# Patient Record
Sex: FEMALE | Race: ASIAN | NOT HISPANIC OR LATINO
[De-identification: names, ages, dates, MRNs, and addresses within clinical notes are randomized per-mention and may not be internally consistent; named-entity substitution may affect disease eponyms.]

---

## 2017-01-11 ENCOUNTER — RECORDS - HEALTHEAST (OUTPATIENT)
Dept: ADMINISTRATIVE | Facility: OTHER | Age: 27
End: 2017-01-11

## 2017-03-21 ENCOUNTER — OFFICE VISIT - HEALTHEAST (OUTPATIENT)
Dept: FAMILY MEDICINE | Facility: CLINIC | Age: 27
End: 2017-03-21

## 2017-03-21 ENCOUNTER — COMMUNICATION - HEALTHEAST (OUTPATIENT)
Dept: TELEHEALTH | Facility: CLINIC | Age: 27
End: 2017-03-21

## 2017-03-21 ENCOUNTER — AMBULATORY - HEALTHEAST (OUTPATIENT)
Dept: LAB | Facility: CLINIC | Age: 27
End: 2017-03-21

## 2017-03-21 DIAGNOSIS — Z71.9 HEALTH EDUCATION: ICD-10-CM

## 2017-03-21 DIAGNOSIS — R73.03 PREDIABETES: ICD-10-CM

## 2017-03-21 DIAGNOSIS — E66.9 OBESITY (BMI 35.0-39.9 WITHOUT COMORBIDITY): ICD-10-CM

## 2017-03-21 DIAGNOSIS — E55.9 VITAMIN D DEFICIENCY: ICD-10-CM

## 2017-03-21 DIAGNOSIS — R63.5 WEIGHT GAIN: ICD-10-CM

## 2017-03-21 DIAGNOSIS — E88.810 DYSMETABOLIC SYNDROME: ICD-10-CM

## 2017-03-21 LAB
CHOLEST SERPL-MCNC: 170 MG/DL
FASTING STATUS PATIENT QL REPORTED: YES
HBA1C MFR BLD: 5.5 % (ref 3.5–6)
HDLC SERPL-MCNC: 47 MG/DL
LDLC SERPL CALC-MCNC: 77 MG/DL
TRIGL SERPL-MCNC: 229 MG/DL

## 2017-03-21 ASSESSMENT — MIFFLIN-ST. JEOR: SCORE: 1707.89

## 2017-03-23 ENCOUNTER — COMMUNICATION - HEALTHEAST (OUTPATIENT)
Dept: FAMILY MEDICINE | Facility: CLINIC | Age: 27
End: 2017-03-23

## 2017-03-25 ENCOUNTER — COMMUNICATION - HEALTHEAST (OUTPATIENT)
Dept: FAMILY MEDICINE | Facility: CLINIC | Age: 27
End: 2017-03-25

## 2017-04-17 ENCOUNTER — OFFICE VISIT - HEALTHEAST (OUTPATIENT)
Dept: FAMILY MEDICINE | Facility: CLINIC | Age: 27
End: 2017-04-17

## 2017-04-17 DIAGNOSIS — E88.810 DYSMETABOLIC SYNDROME: ICD-10-CM

## 2017-04-17 DIAGNOSIS — R73.03 PREDIABETES: ICD-10-CM

## 2017-04-17 ASSESSMENT — MIFFLIN-ST. JEOR: SCORE: 1710.61

## 2017-05-16 ENCOUNTER — OFFICE VISIT - HEALTHEAST (OUTPATIENT)
Dept: FAMILY MEDICINE | Facility: CLINIC | Age: 27
End: 2017-05-16

## 2017-05-16 DIAGNOSIS — E88.810 DYSMETABOLIC SYNDROME: ICD-10-CM

## 2017-05-16 DIAGNOSIS — R73.03 PREDIABETES: ICD-10-CM

## 2017-05-16 ASSESSMENT — MIFFLIN-ST. JEOR: SCORE: 1646.66

## 2017-06-12 ENCOUNTER — OFFICE VISIT - HEALTHEAST (OUTPATIENT)
Dept: FAMILY MEDICINE | Facility: CLINIC | Age: 27
End: 2017-06-12

## 2017-06-12 DIAGNOSIS — Z30.9 CONTRACEPTION MANAGEMENT: ICD-10-CM

## 2017-06-12 DIAGNOSIS — R73.03 PREDIABETES: ICD-10-CM

## 2017-06-12 DIAGNOSIS — E55.9 VITAMIN D DEFICIENCY: ICD-10-CM

## 2017-06-12 DIAGNOSIS — E88.810 DYSMETABOLIC SYNDROME: ICD-10-CM

## 2017-06-12 ASSESSMENT — MIFFLIN-ST. JEOR: SCORE: 1613.54

## 2017-07-10 ENCOUNTER — COMMUNICATION - HEALTHEAST (OUTPATIENT)
Dept: FAMILY MEDICINE | Facility: CLINIC | Age: 27
End: 2017-07-10

## 2017-07-13 ENCOUNTER — AMBULATORY - HEALTHEAST (OUTPATIENT)
Dept: LAB | Facility: CLINIC | Age: 27
End: 2017-07-13

## 2017-07-13 DIAGNOSIS — E55.9 VITAMIN D DEFICIENCY: ICD-10-CM

## 2017-07-13 DIAGNOSIS — E88.810 DYSMETABOLIC SYNDROME: ICD-10-CM

## 2017-07-13 LAB
CHOLEST SERPL-MCNC: 203 MG/DL
FASTING STATUS PATIENT QL REPORTED: YES
HDLC SERPL-MCNC: 48 MG/DL
LDLC SERPL CALC-MCNC: 116 MG/DL
TRIGL SERPL-MCNC: 193 MG/DL

## 2017-07-17 ENCOUNTER — OFFICE VISIT - HEALTHEAST (OUTPATIENT)
Dept: FAMILY MEDICINE | Facility: CLINIC | Age: 27
End: 2017-07-17

## 2017-07-17 DIAGNOSIS — R73.03 PREDIABETES: ICD-10-CM

## 2017-07-17 DIAGNOSIS — E88.810 DYSMETABOLIC SYNDROME: ICD-10-CM

## 2017-07-17 DIAGNOSIS — N91.2 AMENORRHEA: ICD-10-CM

## 2017-07-17 ASSESSMENT — MIFFLIN-ST. JEOR: SCORE: 1593.59

## 2017-08-15 ENCOUNTER — OFFICE VISIT - HEALTHEAST (OUTPATIENT)
Dept: FAMILY MEDICINE | Facility: CLINIC | Age: 27
End: 2017-08-15

## 2017-08-15 DIAGNOSIS — E88.810 DYSMETABOLIC SYNDROME: ICD-10-CM

## 2017-08-15 DIAGNOSIS — R73.03 PREDIABETES: ICD-10-CM

## 2017-08-15 ASSESSMENT — MIFFLIN-ST. JEOR: SCORE: 1606.74

## 2017-09-13 ENCOUNTER — OFFICE VISIT - HEALTHEAST (OUTPATIENT)
Dept: FAMILY MEDICINE | Facility: CLINIC | Age: 27
End: 2017-09-13

## 2017-09-13 DIAGNOSIS — E88.810 DYSMETABOLIC SYNDROME: ICD-10-CM

## 2017-09-13 DIAGNOSIS — E55.9 VITAMIN D DEFICIENCY: ICD-10-CM

## 2017-09-13 DIAGNOSIS — R73.03 PREDIABETES: ICD-10-CM

## 2017-09-13 NOTE — ASSESSMENT & PLAN NOTE
27 y.o. year old female in clinic today to discuss treatment of the following conditions through diet and lifestyle modification and weight loss:  1. BMI 38.0-38.9,adult    2. Prediabetes    3. Dysmetabolic syndrome    4. Vitamin D deficiency      The patient's efforts this past month were successful as evidenced by weight loss.  The patient was unable to establish an exercise regimen that worked with her knee injury.  Her surgeries next week.  We discussed adherence to physical therapy recommendations as well as resistance training and core strengthening.  The patient will specifically work on resuming meal prepping/planning she found this particularly helpful early in the program.  She is not interested in tracking and using a quantitative approach.  She will abstain from phentermine until after surgery.  Return to clinic in 1 month.

## 2017-10-16 ENCOUNTER — OFFICE VISIT - HEALTHEAST (OUTPATIENT)
Dept: FAMILY MEDICINE | Facility: CLINIC | Age: 27
End: 2017-10-16

## 2017-10-16 DIAGNOSIS — E55.9 VITAMIN D DEFICIENCY: ICD-10-CM

## 2017-10-16 DIAGNOSIS — E88.810 DYSMETABOLIC SYNDROME: ICD-10-CM

## 2017-10-16 DIAGNOSIS — R73.03 PREDIABETES: ICD-10-CM

## 2017-10-16 ASSESSMENT — MIFFLIN-ST. JEOR: SCORE: 1593.59

## 2017-12-13 ENCOUNTER — OFFICE VISIT - HEALTHEAST (OUTPATIENT)
Dept: FAMILY MEDICINE | Facility: CLINIC | Age: 27
End: 2017-12-13

## 2017-12-13 DIAGNOSIS — E55.9 VITAMIN D DEFICIENCY: ICD-10-CM

## 2017-12-13 DIAGNOSIS — R73.03 PREDIABETES: ICD-10-CM

## 2017-12-13 DIAGNOSIS — E88.810 DYSMETABOLIC SYNDROME: ICD-10-CM

## 2018-01-25 ENCOUNTER — COMMUNICATION - HEALTHEAST (OUTPATIENT)
Dept: FAMILY MEDICINE | Facility: CLINIC | Age: 28
End: 2018-01-25

## 2018-01-25 DIAGNOSIS — E88.810 DYSMETABOLIC SYNDROME: ICD-10-CM

## 2018-01-25 DIAGNOSIS — R73.03 PREDIABETES: ICD-10-CM

## 2018-01-25 DIAGNOSIS — E55.9 VITAMIN D DEFICIENCY: ICD-10-CM

## 2018-02-08 ENCOUNTER — OFFICE VISIT - HEALTHEAST (OUTPATIENT)
Dept: FAMILY MEDICINE | Facility: CLINIC | Age: 28
End: 2018-02-08

## 2018-02-08 DIAGNOSIS — E55.9 VITAMIN D DEFICIENCY: ICD-10-CM

## 2018-02-08 DIAGNOSIS — E88.810 DYSMETABOLIC SYNDROME: ICD-10-CM

## 2018-02-08 DIAGNOSIS — R73.03 PREDIABETES: ICD-10-CM

## 2018-02-08 ASSESSMENT — MIFFLIN-ST. JEOR: SCORE: 1601.75

## 2018-08-17 ENCOUNTER — COMMUNICATION - HEALTHEAST (OUTPATIENT)
Dept: FAMILY MEDICINE | Facility: CLINIC | Age: 28
End: 2018-08-17

## 2020-06-01 ENCOUNTER — COMMUNICATION - HEALTHEAST (OUTPATIENT)
Dept: FAMILY MEDICINE | Facility: CLINIC | Age: 30
End: 2020-06-01

## 2020-06-02 ENCOUNTER — COMMUNICATION - HEALTHEAST (OUTPATIENT)
Dept: FAMILY MEDICINE | Facility: CLINIC | Age: 30
End: 2020-06-02

## 2020-06-04 ENCOUNTER — OFFICE VISIT - HEALTHEAST (OUTPATIENT)
Dept: FAMILY MEDICINE | Facility: CLINIC | Age: 30
End: 2020-06-04

## 2020-06-04 DIAGNOSIS — E88.810 DYSMETABOLIC SYNDROME: ICD-10-CM

## 2020-06-04 DIAGNOSIS — E55.9 VITAMIN D DEFICIENCY: ICD-10-CM

## 2020-06-04 DIAGNOSIS — E66.01 CLASS 3 SEVERE OBESITY DUE TO EXCESS CALORIES WITH SERIOUS COMORBIDITY AND BODY MASS INDEX (BMI) OF 40.0 TO 44.9 IN ADULT (H): ICD-10-CM

## 2020-06-04 DIAGNOSIS — R06.83 SNORES: ICD-10-CM

## 2020-06-04 DIAGNOSIS — R73.03 PREDIABETES: ICD-10-CM

## 2020-06-04 DIAGNOSIS — Z13.29 SCREENING FOR THYROID DISORDER: ICD-10-CM

## 2020-06-04 DIAGNOSIS — E66.813 CLASS 3 SEVERE OBESITY DUE TO EXCESS CALORIES WITH SERIOUS COMORBIDITY AND BODY MASS INDEX (BMI) OF 40.0 TO 44.9 IN ADULT (H): ICD-10-CM

## 2020-06-04 DIAGNOSIS — Z13.0 SCREENING FOR DEFICIENCY ANEMIA: ICD-10-CM

## 2020-06-04 NOTE — ASSESSMENT & PLAN NOTE
30 y.o. female in clinic today to discuss treatment of the following conditions through radical diet change, lifestyle modification and weight loss:  1. Class 3 severe obesity due to excess calories with serious comorbidity and body mass index (BMI) of 40.0 to 44.9 in adult (H)    2. Dysmetabolic syndrome    3. Prediabetes    4. Vitamin D deficiency    5. Snores    6. Screening for thyroid disorder    7. Screening for deficiency anemia      This patient was seen and evaluated after lengthy absence from our medical weight loss program.  In the past, she lost approximately 15% of her total body weight while on a high dose of phentermine.  She stopped taking the medicine and resumed eating her previous diet when she ruptured her ACL and underwent a surgery.  Her weight today is as high as is ever been.  Laboratory testing from 2017 fits with metabolic syndrome.  She is a strong family history of diabetes in her mother who has recently diagnosed with diabetes and is now on insulin.  Based on her description of her diet, I estimate that her current menu is at least 75% carbohydrate-based.  We do lengthy conversation regarding the nature of hyperinsulinemia, metabolic syndrome and prediabetes.  We will repeat her fasting lab work within the next 1-2 weeks to confirm that her history is still accurate.    I recommended a low carbohydrate/high protein/high fat diet.  She mentioned that she has a friend who is been very successful with a ketogenic style of eating.  There is no contraindication to her pursuing this strategy.  We discussed that culturally it may be difficult given her heritage in the foods that she is traditionally been exposed to.    To help facilitate her dietary changes, I have recommended medications that are listed below.  We discussed the side effects of these medications including the phentermine and topiramate a pregnancy category X.  The patient reports that she is sexually active and she diligently  uses a condom with her male partner.    Other aspects of her history that were not fully explored include snoring which is worsened as she gained weight, lack of hours in bed (she estimates that she only sleeps 6 hours per night).  She also has not been exercising on a regular basis.    I have recommended the following interventions:    Medications Ordered   Medications     metFORMIN (GLUCOPHAGE XR) 500 MG 24 hr tablet     Sig: Take 1 tablet (500 mg total) by mouth daily with breakfast.     Dispense:  90 tablet     Refill:  1     phentermine 15 MG capsule     Sig:Take 1 capsule (15 mg total) by mouth every morning.     Dispense:  30 capsule     Refill:  0     topiramate (TOPAMAX) 25 MG tablet     Sig: Take 1 tablet (25 mg total) by mouth 2 (two) times a day. (1 tablet/dinner for first 7 days.  Add AM dose if no side effects)     Dispense:  60 tablet     Refill:  1     This patient is a candidate for bariatric surgery (based on BMI or BMI + comorbidities).  This option was not discussed.    Barriers to weight loss that are not modifiable at this time include: none.     [place] for the follow-up appointment will include goals of therapy, definition of success and focus on physical activity/exercise plan.      Return to clinic in 4 weeks.

## 2020-06-11 ENCOUNTER — AMBULATORY - HEALTHEAST (OUTPATIENT)
Dept: LAB | Facility: CLINIC | Age: 30
End: 2020-06-11

## 2020-06-11 DIAGNOSIS — Z13.0 SCREENING FOR DEFICIENCY ANEMIA: ICD-10-CM

## 2020-06-11 DIAGNOSIS — E88.810 DYSMETABOLIC SYNDROME: ICD-10-CM

## 2020-06-11 DIAGNOSIS — R73.03 PREDIABETES: ICD-10-CM

## 2020-06-11 DIAGNOSIS — E66.01 CLASS 3 SEVERE OBESITY DUE TO EXCESS CALORIES WITH SERIOUS COMORBIDITY AND BODY MASS INDEX (BMI) OF 40.0 TO 44.9 IN ADULT (H): ICD-10-CM

## 2020-06-11 DIAGNOSIS — Z13.29 SCREENING FOR THYROID DISORDER: ICD-10-CM

## 2020-06-11 DIAGNOSIS — E55.9 VITAMIN D DEFICIENCY: ICD-10-CM

## 2020-06-11 DIAGNOSIS — E66.813 CLASS 3 SEVERE OBESITY DUE TO EXCESS CALORIES WITH SERIOUS COMORBIDITY AND BODY MASS INDEX (BMI) OF 40.0 TO 44.9 IN ADULT (H): ICD-10-CM

## 2020-06-11 LAB
ANION GAP SERPL CALCULATED.3IONS-SCNC: 12 MMOL/L (ref 5–18)
BUN SERPL-MCNC: 12 MG/DL (ref 8–22)
CALCIUM SERPL-MCNC: 9.6 MG/DL (ref 8.5–10.5)
CHLORIDE BLD-SCNC: 105 MMOL/L (ref 98–107)
CHOLEST SERPL-MCNC: 204 MG/DL
CO2 SERPL-SCNC: 23 MMOL/L (ref 22–31)
CREAT SERPL-MCNC: 0.81 MG/DL (ref 0.6–1.1)
ERYTHROCYTE [DISTWIDTH] IN BLOOD BY AUTOMATED COUNT: 12 % (ref 11–14.5)
FASTING STATUS PATIENT QL REPORTED: YES
GFR SERPL CREATININE-BSD FRML MDRD: >60 ML/MIN/1.73M2
GLUCOSE BLD-MCNC: 101 MG/DL (ref 70–125)
HBA1C MFR BLD: 5.2 % (ref 3.5–6)
HCT VFR BLD AUTO: 44.1 % (ref 35–47)
HDLC SERPL-MCNC: 48 MG/DL
HGB BLD-MCNC: 15 G/DL (ref 12–16)
LDLC SERPL CALC-MCNC: 121 MG/DL
MCH RBC QN AUTO: 29.4 PG (ref 27–34)
MCHC RBC AUTO-ENTMCNC: 34 G/DL (ref 32–36)
MCV RBC AUTO: 87 FL (ref 80–100)
PLATELET # BLD AUTO: 311 THOU/UL (ref 140–440)
PMV BLD AUTO: 7.6 FL (ref 7–10)
POTASSIUM BLD-SCNC: 4.5 MMOL/L (ref 3.5–5)
RBC # BLD AUTO: 5.1 MILL/UL (ref 3.8–5.4)
SODIUM SERPL-SCNC: 140 MMOL/L (ref 136–145)
TRIGL SERPL-MCNC: 173 MG/DL
TSH SERPL DL<=0.005 MIU/L-ACNC: 2.3 UIU/ML (ref 0.3–5)
WBC: 4.6 THOU/UL (ref 4–11)

## 2020-06-12 LAB
25(OH)D3 SERPL-MCNC: 16 NG/ML (ref 30–80)
25(OH)D3 SERPL-MCNC: 16 NG/ML (ref 30–80)

## 2020-07-03 ENCOUNTER — OFFICE VISIT - HEALTHEAST (OUTPATIENT)
Dept: FAMILY MEDICINE | Facility: CLINIC | Age: 30
End: 2020-07-03

## 2020-07-03 DIAGNOSIS — E88.810 DYSMETABOLIC SYNDROME: ICD-10-CM

## 2020-07-03 DIAGNOSIS — E66.01 CLASS 3 SEVERE OBESITY DUE TO EXCESS CALORIES WITH SERIOUS COMORBIDITY AND BODY MASS INDEX (BMI) OF 40.0 TO 44.9 IN ADULT (H): ICD-10-CM

## 2020-07-03 DIAGNOSIS — E66.813 CLASS 3 SEVERE OBESITY DUE TO EXCESS CALORIES WITH SERIOUS COMORBIDITY AND BODY MASS INDEX (BMI) OF 40.0 TO 44.9 IN ADULT (H): ICD-10-CM

## 2020-07-03 DIAGNOSIS — R73.03 PREDIABETES: ICD-10-CM

## 2020-07-03 NOTE — ASSESSMENT & PLAN NOTE
30 y.o. year old female in clinic today to discuss treatment of the following conditions through diet and lifestyle modification and weight loss:  1. Class 3 severe obesity due to excess calories with serious comorbidity and body mass index (BMI) of 40.0 to 44.9 in adult (H)    2. Dysmetabolic syndrome    3. Prediabetes      The patient's weight loss result sincethe last visit was successful based on improvement in nutrition.  Hunger is improved.  She is more aware of her nutrition.  She does feel restricted with her meal plan and she has not had much physical activity.    - continue phentermine.  Increase topiramate   - continue metformin   - check in 4 weeks or so   - add variety to meal plans.

## 2020-07-06 ENCOUNTER — COMMUNICATION - HEALTHEAST (OUTPATIENT)
Dept: FAMILY MEDICINE | Facility: CLINIC | Age: 30
End: 2020-07-06

## 2020-07-09 ENCOUNTER — COMMUNICATION - HEALTHEAST (OUTPATIENT)
Dept: FAMILY MEDICINE | Facility: CLINIC | Age: 30
End: 2020-07-09

## 2020-08-04 ENCOUNTER — OFFICE VISIT - HEALTHEAST (OUTPATIENT)
Dept: FAMILY MEDICINE | Facility: CLINIC | Age: 30
End: 2020-08-04

## 2020-08-04 DIAGNOSIS — E55.9 VITAMIN D DEFICIENCY: ICD-10-CM

## 2020-08-04 DIAGNOSIS — R06.83 SNORES: ICD-10-CM

## 2020-08-04 DIAGNOSIS — E88.810 DYSMETABOLIC SYNDROME: ICD-10-CM

## 2020-08-04 DIAGNOSIS — E66.813 CLASS 3 SEVERE OBESITY DUE TO EXCESS CALORIES WITH SERIOUS COMORBIDITY AND BODY MASS INDEX (BMI) OF 40.0 TO 44.9 IN ADULT (H): ICD-10-CM

## 2020-08-04 DIAGNOSIS — E66.01 CLASS 3 SEVERE OBESITY DUE TO EXCESS CALORIES WITH SERIOUS COMORBIDITY AND BODY MASS INDEX (BMI) OF 40.0 TO 44.9 IN ADULT (H): ICD-10-CM

## 2020-08-04 DIAGNOSIS — R73.03 PREDIABETES: ICD-10-CM

## 2020-08-04 NOTE — ASSESSMENT & PLAN NOTE
"30 y.o. year old female in clinic today to discuss treatment of the following conditions through diet and lifestyle modification and weight loss:  1. Class 3 severe obesity due to excess calories with serious comorbidity and body mass index (BMI) of 40.0 to 44.9 in adult (H)    2. Dysmetabolic syndrome    3. Vitamin D deficiency    4. Prediabetes    5. Snores      The patient's weight loss result since the last visit was mixed based on weight stability.  The patient has been off plan for the past month.  She has a goal to meal plan and shop for healthy foods.  I discouraged her from her goal of \"keto\" eating.     - continue phentermine/topiramate.  No side effects with dose adjustment.     - continue walking/swimming and exercising.    - make a meal plan.    Follow-up: 1 months    "

## 2020-08-31 ENCOUNTER — OFFICE VISIT - HEALTHEAST (OUTPATIENT)
Dept: FAMILY MEDICINE | Facility: CLINIC | Age: 30
End: 2020-08-31

## 2020-08-31 DIAGNOSIS — E88.810 DYSMETABOLIC SYNDROME: ICD-10-CM

## 2020-08-31 DIAGNOSIS — R73.03 PREDIABETES: ICD-10-CM

## 2020-08-31 DIAGNOSIS — E66.813 CLASS 3 SEVERE OBESITY DUE TO EXCESS CALORIES WITH SERIOUS COMORBIDITY AND BODY MASS INDEX (BMI) OF 40.0 TO 44.9 IN ADULT (H): ICD-10-CM

## 2020-08-31 DIAGNOSIS — E66.01 CLASS 3 SEVERE OBESITY DUE TO EXCESS CALORIES WITH SERIOUS COMORBIDITY AND BODY MASS INDEX (BMI) OF 40.0 TO 44.9 IN ADULT (H): ICD-10-CM

## 2020-08-31 RX ORDER — METFORMIN HCL 500 MG
500 TABLET, EXTENDED RELEASE 24 HR ORAL
Qty: 90 TABLET | Refills: 1 | Status: SHIPPED | OUTPATIENT
Start: 2020-08-31 | End: 2022-09-19

## 2020-08-31 NOTE — ASSESSMENT & PLAN NOTE
30 y.o. year old female in clinic today to discuss treatment of the following conditions through diet and lifestyle modification and weight loss:  1. Class 3 severe obesity due to excess calories with serious comorbidity and body mass index (BMI) of 40.0 to 44.9 in adult (H)    2. Prediabetes    3. Dysmetabolic syndrome      The patient's weight loss result sincethe last visit was successful based on weight loss.  Weekend diet is different than her week day nutrition.   She is sleep deprived.  She is adding more nutrition.    - exercise has been scarce.     - recommended more sleep per night   - continue phentermine/topiramate.  Continue metformin.   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   - 6 week follow-up

## 2020-10-16 ENCOUNTER — OFFICE VISIT - HEALTHEAST (OUTPATIENT)
Dept: FAMILY MEDICINE | Facility: CLINIC | Age: 30
End: 2020-10-16

## 2020-10-16 DIAGNOSIS — R73.03 PREDIABETES: ICD-10-CM

## 2020-10-16 DIAGNOSIS — R06.83 SNORES: ICD-10-CM

## 2020-10-16 DIAGNOSIS — E66.813 CLASS 3 SEVERE OBESITY DUE TO EXCESS CALORIES WITH SERIOUS COMORBIDITY AND BODY MASS INDEX (BMI) OF 40.0 TO 44.9 IN ADULT (H): ICD-10-CM

## 2020-10-16 DIAGNOSIS — E66.01 CLASS 3 SEVERE OBESITY DUE TO EXCESS CALORIES WITH SERIOUS COMORBIDITY AND BODY MASS INDEX (BMI) OF 40.0 TO 44.9 IN ADULT (H): ICD-10-CM

## 2020-10-16 DIAGNOSIS — E55.9 VITAMIN D DEFICIENCY: ICD-10-CM

## 2020-10-16 DIAGNOSIS — E88.810 DYSMETABOLIC SYNDROME: ICD-10-CM

## 2020-10-16 RX ORDER — TOPIRAMATE 25 MG/1
TABLET, FILM COATED ORAL
Qty: 90 TABLET | Status: SHIPPED | OUTPATIENT
Start: 2020-10-16 | End: 2022-09-19

## 2020-10-16 RX ORDER — TOPIRAMATE 25 MG/1
50 TABLET, FILM COATED ORAL
Refills: 2 | Status: SHIPPED | OUTPATIENT
Start: 2020-10-16 | End: 2022-09-19

## 2020-10-16 RX ORDER — TOPIRAMATE 25 MG/1
25 TABLET, FILM COATED ORAL DAILY
Refills: 2 | Status: SHIPPED | OUTPATIENT
Start: 2020-10-16 | End: 2022-09-19

## 2020-10-16 NOTE — ASSESSMENT & PLAN NOTE
"30 y.o. year old female in clinic today to discuss treatment of the following conditions through diet and lifestyle modification and weight loss:  1. Class 3 severe obesity due to excess calories with serious comorbidity and body mass index (BMI) of 40.0 to 44.9 in adult (H)    2. Dysmetabolic syndrome    3. Prediabetes    4. Vitamin D deficiency    5. Snores      The patient's weight loss result since the last visit was successful based on increased exercise.  She anticipates struggles with ad graeme carb eating during the holiday season.  She is planning\"discipline\" during the holiday seasons.  \"Weight is a little slow.\"   - continue phentermine/topiramate.  We will increase dose of phentermine for late November through early January and then revert to previous dose of 15 mg.  Patient understands that this is to solidify the lifestyle gains she has earned throughout the summer.   - I encouraged her to think about her exercise plan for the winter months   - 2 month follow-up recommended based on perceived holiday season challenges.   "

## 2021-05-30 VITALS — BODY MASS INDEX: 37.94 KG/M2 | HEIGHT: 64 IN | WEIGHT: 222.2 LBS

## 2021-05-30 VITALS — BODY MASS INDEX: 38.04 KG/M2 | HEIGHT: 64 IN | WEIGHT: 222.8 LBS

## 2021-05-31 VITALS — BODY MASS INDEX: 33.63 KG/M2 | HEIGHT: 64 IN | WEIGHT: 197 LBS

## 2021-05-31 VITALS — HEIGHT: 64 IN | WEIGHT: 197 LBS | BODY MASS INDEX: 33.63 KG/M2

## 2021-05-31 VITALS — HEIGHT: 64 IN | BODY MASS INDEX: 34.38 KG/M2 | WEIGHT: 201.4 LBS

## 2021-05-31 VITALS — WEIGHT: 200.9 LBS | BODY MASS INDEX: 34.48 KG/M2

## 2021-05-31 VITALS — BODY MASS INDEX: 34.13 KG/M2 | WEIGHT: 199.9 LBS | HEIGHT: 64 IN

## 2021-05-31 VITALS — BODY MASS INDEX: 33.64 KG/M2 | WEIGHT: 196 LBS

## 2021-05-31 VITALS — BODY MASS INDEX: 35.63 KG/M2 | WEIGHT: 208.7 LBS | HEIGHT: 64 IN

## 2021-06-01 VITALS — BODY MASS INDEX: 33.94 KG/M2 | HEIGHT: 64 IN | WEIGHT: 198.8 LBS

## 2021-06-04 VITALS — BODY MASS INDEX: 42.74 KG/M2 | WEIGHT: 249 LBS

## 2021-06-04 VITALS — BODY MASS INDEX: 41.02 KG/M2 | WEIGHT: 239 LBS

## 2021-06-05 VITALS — WEIGHT: 224 LBS | BODY MASS INDEX: 38.45 KG/M2

## 2021-06-08 NOTE — PATIENT INSTRUCTIONS - HE
Keys to Success    Minimum 3 meals per day, control daily calories   - 1200 female   - 1600 male  Minimum of 4 palm servings of protein daily.  Begin everyday with protein    - ~80 - 90 gm for female   - 120 gm for male  Be mindful of net carbs  50-75 gm/day   - (Total carbs - fiber)   - Less than 5 gm of carbs with breakfast    Supplementation   - 1 multivitamin    - clear and copious urination (adequate water consumption)   - 2000 mg fish oil capsules    - 2000 iu Vitamin D (I will be checking your level today and may send a prescription to your pharmacy if necessary)  ______________________________    Dr. Lane Hernandez MD   - The Obesity Code   - YouTube    Dr. Julio Felipe MD   - Always Hungry    Dr. Yenni uRano, DO.   Search for her name in Youtube with added criteria: Elisa Schmidt
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
## 2021-06-09 NOTE — PROGRESS NOTES
PATIENT INTAKE      ASSESSMENT:    1. Dysmetabolic syndrome     2. Obesity (BMI 35.0-39.9 without comorbidity)  Comprehensive Metabolic Panel    Glycosylated Hemoglobin A1c    HM2(CBC w/o Differential)    Lipid Cascade    Thyroid Cascade    Vitamin D, Total (25-Hydroxy)   3. Prediabetes     4. Vitamin D deficiency           PLAN    The patient attended group visit to learn about obesity as a disease, an approach to treatment and metabolic factors.  Nutrition counseling reviewed.    Labs ordered and will review and discuss with the patient.    Body composition analyzer done and results reviewed with the patient.  Please see scanned results in chart.    Discussed with the patient that phentermine is a pregnancy category X medication and that is is essential that a reliable form of birth control be used while taking this medication if the patient will be sexually active.  She expresses understanding.    Rx for phentermine given and discussed risks, benefits, potential side effects, and when to take the medication.  All questions were answered.    Metformin prescribed due to findings consistent with prediabetes and dysmetabolic syndrome.    Recommend journaling and tracking food intake using either an online program such as myfitnesspal.com or loseit.com or tracking using a paper and pencil.  Advised paying particular attention to total carbohydrates, fiber, protein, calories, and fats, and added sugars.     Recommend increasing movement throughout the day--sitting less, moving more.  Will increase activity over time to reach a goal of at least 150 minutes of moderate exercise each week.    Behavior modification:  Cognitive restructuring exercises, journaling stressors, triggers for food cravings.    Dietary Intervention:  Reduced calorie, reduced carbohydrates, whole food diet.    Greater than 50% of this 30 minute visit was spent in counseling regarding obesity is a disease as well as the nutritional and exercise  recommendations of our program as it pertains to the patient's own individual healthcare needs.    Patient instructed to follow up in 1 month.      This note has been dictated using voice recognition software. Any grammatical or context distortions are unintentional and inherent to the software      SUBJECTIVE:    Patient presents for treatment of chronic, comorbid conditions using intensive therapeutic lifestyle interventions including nutrition, physical activity, and behavior management.  The patient is motivated to lose weight in order to feel better.  She specifically mentions that she feels achy and has foot pain and attributes that to her excessive weight.  The patient successfully lost weight at the age of 18 through vigorous exercise as well as not eating very much.  She has steadily gained weight since that time.  The patient is single and is not currently in a sexual relationship.  She works full-time as a  and often works clear into the evening hours.  She consistently skips breakfast at least 5 times per week.  She will then eat around 1 or 9 PM her to other meals a day.  She often struggles with an excessive appetite and finds when she overeats.  She also reports that she frequently sits in front of the TV or computer when eating.  The patient notes that she did tear her ACL about a month ago and plans to have that surgically repaired closer to the end of the year.  She enjoys being physically active and enjoys sports.    What would you like to weigh (goal weight):  150  At what age were you last at that weight:  18  Any previous prescription weight loss medication:  No  Menses regular:  yes  Number of children:  0  Are you pregnant: no  Are you currently using contraception: no  Do you exercise regularly:  No  Any problems with exercise:  Yes; recently torn ACL  Do you eat nutritiously?  no  Do you eat excessively?  yes  Do you count calories?  no  Do you snore at night or ever stop  breathing? yes  Have you been overweight all your life?  yes  If not, how long?  n/a  Do you have a history of eating disorder?  No  Have you ever had or been treated for alcohol or other substance abuse/dependence:   No    Patient Active Problem List   Diagnosis     Dysmetabolic syndrome     Prediabetes     Vitamin D deficiency       No past medical history on file.    No past surgical history on file.    No current outpatient prescriptions on file prior to visit.     No current facility-administered medications on file prior to visit.        No Known Allergies      No family history on file.  Family History also positive for  hypertension in her father.    Social History     Social History     Marital status: Single     Spouse name: N/A     Number of children: N/A     Years of education: N/A     Social History Main Topics     Smoking status: Never Smoker     Smokeless tobacco: Never Used     Alcohol use Not on file     Drug use: Not on file     Sexual activity: Not on file     Other Topics Concern     Not on file     Social History Narrative     No narrative on file         ROS  A comprehensive review of systems was negative.  Pertinent items are noted in HPI.  Patient denies chest pain or pressure, shortness of breath, exertional intolerance, palpitations, or lightheadedness.      Vitals:    03/21/17 0815   BP: 138/82   Pulse: 72     Initial Weight: 222.2 lbs  Weight: 222 lb 3.2 oz (100.8 kg)  Weight loss from initial: 0  % Weight loss: 0 %  Body Fat %: 47.8  Waist Circumference: 47.5 inches  Neck Circumference: 15 inches  Body mass index is 38.14 kg/(m^2).    EXAM                    General   Appearance:  Alert, pleasant, cooperative, no distress, appears stated age, patient is obese   Neck:   Supple, symmetrical, trachea midline, no adenopathy;     thyroid:  no enlargement/tenderness/nodules   Lungs:     Clear to auscultation bilaterally without wheezes, rales, or rhonchi, respirations unlabored    Heart:     Regular rate and rhythm, S1 and S2 normal, no murmur, rub   or gallop                                 Abdomen:  Soft, nontender, nondistended. No hepatosplenomegaly.          Extremities:  Warm, well perfused, no edema.           Skin:  Skin color, texture, and turgor normal. Acanthosis nigricans present on neck.    Body composition analyzer done and results reviewed with the patient.  Please see scanned results in chart.  Labs ordered and will review and discuss with the patient.

## 2021-06-09 NOTE — TELEPHONE ENCOUNTER
----- Message from Emil Martinez MD sent at 7/3/2020  5:04 PM CDT -----  Please schedule WLP follow-up

## 2021-06-09 NOTE — TELEPHONE ENCOUNTER
Left message to call back for: Jorge   Information to relay to patient:  Pt needs to schedule a 4 week weight loss follow up, LM to call back to schedule -last seen 07/03/2020    Naya Jones LPN

## 2021-06-09 NOTE — PROGRESS NOTES
"Jorge Holliday is a 30 y.o. female who is being evaluated via a billable video visit.      The patient has been notified of following:     \"This video visit will be conducted via a call between you and your physician/provider. We have found that certain health care needs can be provided without the need for an in-person physical exam.  This service lets us provide the care you need with a video conversation.  If a prescription is necessary we can send it directly to your pharmacy.  If lab work is needed we can place an order for that and you can then stop by our lab to have the test done at a later time.    Video visits are billed at different rates depending on your insurance coverage. Please reach out to your insurance provider with any questions.    If during the course of the call the physician/provider feels a video visit is not appropriate, you will not be charged for this service.\"    Patient has given verbal consent to a Video visit? Yes  How would you like to obtain your AVS? AVS Preference: [employer].  Patient would like the video invitation sent by: Text to cell phone: [phone]   Will anyone else be joining your video visit? No    Video Start Time: 4:43 PM    Additional provider notes:     Assessment and Plan:     Class 3 severe obesity due to excess calories with serious comorbidity and body mass index (BMI) of 40.0 to 44.9 in adult (H)  30 y.o. year old female in clinic today to discuss treatment of the following conditions through diet and lifestyle modification and weight loss:  1. Class 3 severe obesity due to excess calories with serious comorbidity and body mass index (BMI) of 40.0 to 44.9 in adult (H)    2. Dysmetabolic syndrome    3. Prediabetes      The patient's weight loss result since the last visit was successful based on improvement in nutrition.  Hunger is improved.  She is more aware of her nutrition.  She does feel restricted with her meal plan and she has not had much physical activity.  " "  - continue phentermine.  Increase topiramate   - continue metformin   - check in 4 weeks or so   - add variety to meal plans.        Continue supplements.    Recommend increasing movement throughout the day--sitting less, moving more.  Will increase activity over time to reach a goal of at least 150 minutes of moderate exercise each week.  Behavior modification:  Cognitive restructuring exercises, journaling stressors, triggers for food cravings.  Dietary Intervention:  Reduced calorie, reduced carbohydrates, whole food diet.  Greater than 50% of this 20 minute visit was spent in counseling regarding patient's obesity, medications, dietary, exercise, and behavior modification recommendations.    Subjective  Patient presents for treatment of chronic, comorbid conditions using intensive therapeutic lifestyle interventions including nutrition, physical activity, and behavior management.   - successes: she has been experimenting with a ketogenic style of eating.  She says that she \"can do better.\" By this she means that she wants to be more consistent with meal and exercise.  Clothes are looser.  Energy is \"good.\"   - struggles: she finds low carb eating to be restrictive.     - exercise plan: minimal   - tracking/journaling: ad graeme.  She does have a plan.  She follows keto recipes.     - following nutritional plan: ?.  Deviations from plan: social    - hunger: improved. But she does have some drive to hunger.    - medication: benefits: helps with cravings. side effects: none    No flowsheet data found.    Patient Active Problem List   Diagnosis     Dysmetabolic syndrome     Prediabetes     Vitamin D deficiency     Class 3 severe obesity due to excess calories with serious comorbidity and body mass index (BMI) of 40.0 to 44.9 in adult (H)     Snores     Current Outpatient Medications on File Prior to Visit   Medication Sig Dispense Refill     ergocalciferol (ERGOCALCIFEROL) 1,250 mcg (50,000 unit) capsule Take 1 " capsule (50,000 Units total) by mouth once a week for 12 doses. 12 capsule 0     metFORMIN (GLUCOPHAGE XR) 500 MG 24 hr tablet Take 1 tablet (500 mg total) by mouth daily with breakfast. 90 tablet 1     [DISCONTINUED] phentermine 15 MG capsule Take 1 capsule (15 mg total) by mouth every morning. 30 capsule 0     [DISCONTINUED] topiramate (TOPAMAX) 25 MG tablet Take 1 tablet (25 mg total) by mouth 2 (two) times a day. (1 tablet/dinner for first 7 days.  Add AM dose if no side effects) 60 tablet 1     No current facility-administered medications on file prior to visit.        Objective:  There were no vitals filed for this visit.  Weight: (!) 249 lb (112.9 kg)    There is no height or weight on file to calculate BMI.  No LMP recorded.  GENERAL: Healthy, alert and no distress  EYES: Eyes grossly normal to inspection. No discharge or erythema, or obvious scleral/conjunctival abnormalities.  RESP: No audible wheeze, cough, or visible cyanosis.  No visible retractions or increased work of breathing.    SKIN: Visible skin clear. No significant rash, abnormal pigmentation or lesions.  NEURO: Cranial nerves grossly intact. Mentation and speech appropriate for age.  PSYCH: Mentation appears normal, affect normal/bright, judgement and insight intact, normal speech and appearance well-groomed    This note has been dictated using voice recognition software. Any grammatical or context distortions are unintentional and inherent to the software    Video-Visit Details    Type of service:  Video Visit    Video End Time (time video stopped): 5:03 PM  Originating Location (pt. Location): Home    Distant Location (provider location):  [facility]     Platform used for Video Visit: Minal Martinez MD

## 2021-06-10 NOTE — PROGRESS NOTES
ASSESSMENT:  No diagnosis found.      PLAN:  Follow up in 1 month.  Continue medications.  Continue supplements.  This month concentrate on journaling. We had a long conversation today regarding getting on track. First, I worked to clarify if she is truly ready to make changes and she says that she is but needs more help to know what to eat. She is logging and this is showing carb grams up in the 200-300gm range! She is therefore not using logging to adjust her intake or to support good choices. She was scheduled to meet with a nutritionist 1:1 on Thursday. She feels that the Phentermine did help and was well tolerated, but found that she would feel very hungry later in the day. I also prescribed Metformin today after reviewing her labs.   Doing well with phentermine, refill given today.  Recommend increasing movement throughout the day--sitting less, moving more.  Will increase activity over time to reach a goal of at least 150 minutes of moderate exercise each week.  Behavior modification:  Cognitive restructuring exercises, journaling stressors, triggers for food cravings.  Dietary Intervention:  Reduced calorie, reduced carbohydrates, whole food diet.  Greater than 50% of this 35 minute visit was spent in counseling regarding patient's obesity, medications, dietary, exercise, and behavior modification recommendations.      SUBJECTIVE  Patient presents for treatment of chronic, comorbid conditions using intensive therapeutic lifestyle interventions including nutrition, physical activity, and behavior management. The patient is here for her first month's f/u and did not have a very good start which she attributes to two weeks of vacation. The patient states that the first part of the month went well with logging her foods and she felt that she was making better choices. However, it was hard on vacation to stay on track and find healthy options. She notes going through the drive through often and picking up gas  station food. She remains very motivated to lose weight and does feel that she is ready. However, she does not really know what to eat yet. She notes that she is lactose intolerant so does not eat dairy. She struggles to get in enough protein but found that limiting carbs was even more difficult. She also finds that eating during family gatherings is very difficult.     Are you experiencing any side effects to the medications:  No  Hunger control:  poor  Exercise was discussed: yes; football  Taking supplements:  yes  Discussed journaling food:  yes  Patient is pleased with the current results:  no  The patient is following the nutrition plan:  no  Barriers to losing weight: Needs nutrition support    Patient Active Problem List   Diagnosis     Dysmetabolic syndrome     Prediabetes     Vitamin D deficiency       Current Outpatient Prescriptions on File Prior to Visit   Medication Sig Dispense Refill     phentermine (ADIPEX-P) 37.5 mg tablet Take 1 tablet (37.5 mg total) by mouth every morning before breakfast. 30 tablet 0     No current facility-administered medications on file prior to visit.        The following portions of the patient's history were reviewed and updated as appropriate: allergies, current medications, past family history, past medical history, past social history, past surgical history and problem list.    Review of Systems  Pertinent items are noted in HPI.        OBJECTIVE:  Vitals:    04/17/17 1000   BP: 122/72   Pulse: 68     Initial Weight: 222.2 lbs  Weight: 222 lb 12.8 oz (101.1 kg)  Weight loss from initial: -0.6  % Weight loss: -0.27 %  Body Fat %: 47.8  Waist Circumference: 47.5 inches  Neck Circumference: 15 inches  Body mass index is 38.24 kg/(m^2).  General:  Patient is alert, pleasant, no distress.  Patient is obese.       .  This note has been dictated using voice recognition software. Any grammatical or context distortions are unintentional and inherent to the software

## 2021-06-10 NOTE — PROGRESS NOTES
"ASSESSMENT:  1. Dysmetabolic syndrome     2. Prediabetes     3. BMI 38.0-38.9,adult           PLAN:  Follow up in 1 month.  Continue medications.  Continue supplements.  Doing well with phentermine, refill given today.  Recommend increasing movement throughout the day--sitting less, moving more.  Will increase activity over time to reach a goal of at least 150 minutes of moderate exercise each week.  Behavior modification:  Cognitive restructuring exercises, journaling stressors, triggers for food cravings.  Dietary Intervention:  Reduced calorie, reduced carbohydrates, whole food diet.  Greater than 50% of this 15 minute visit was spent in counseling regarding patient's obesity, medications, dietary, exercise, and behavior modification recommendations.      SUBJECTIVE  Patient presents for treatment of chronic, comorbid conditions using intensive therapeutic lifestyle interventions including nutrition, physical activity, and behavior management. The patient had a very successful month and feels very good about her results. She feels that the visit with the nutritionist as well as the \"\" were both very helpful. She feels that the Phentermine is working better with the divided dosage. She is not logging her foods, but feels that she has a much better handle on her food choices. She is involved with football this summer and exercising quite a bit.     Are you experiencing any side effects to the medications:  No  Hunger control:  good  Exercise was discussed: yes; football practice twice weekly (4 hours and 2 hours)   Taking supplements:  yes  Discussed journaling food:  Yes; not doing it  Patient is pleased with the current results:  yes  The patient is following the nutrition plan:  yes  Barriers to losing weight: weekends are still a struggle    Patient Active Problem List   Diagnosis     Dysmetabolic syndrome     Prediabetes     Vitamin D deficiency     BMI 38.0-38.9,adult       Current Outpatient " Prescriptions on File Prior to Visit   Medication Sig Dispense Refill     metFORMIN (GLUCOPHAGE XR) 500 MG 24 hr tablet Take 1 tablet (500 mg total) by mouth daily with breakfast. 60 tablet 3     [DISCONTINUED] phentermine (ADIPEX-P) 37.5 mg tablet Take 1/2 tablet 11am and 1/2 tablet 3pm 30 tablet 0     No current facility-administered medications on file prior to visit.        The following portions of the patient's history were reviewed and updated as appropriate: allergies, current medications, past family history, past medical history, past social history, past surgical history and problem list.    Review of Systems  Pertinent items are noted in HPI.        OBJECTIVE:  Vitals:    05/16/17 0942   BP: 100/68   Pulse: 72     Initial Weight: 222.2 lbs  Weight: 208 lb 11.2 oz (94.7 kg)  Weight loss from initial: 13.5  % Weight loss: 6.08 %  Body mass index is 35.82 kg/(m^2).  General:  Patient is alert, pleasant, no distress.  Patient is obese.       .  This note has been dictated using voice recognition software. Any grammatical or context distortions are unintentional and inherent to the software

## 2021-06-10 NOTE — PROGRESS NOTES
"Jorge Holliday is a 30 y.o. female who is being evaluated via a billable video visit.      The patient has been notified of following:     \"This video visit will be conducted via a call between you and your physician/provider. We have found that certain health care needs can be provided without the need for an in-person physical exam.  This service lets us provide the care you need with a video conversation.  If a prescription is necessary we can send it directly to your pharmacy.  If lab work is needed we can place an order for that and you can then stop by our lab to have the test done at a later time.    Video visits are billed at different rates depending on your insurance coverage. Please reach out to your insurance provider with any questions.    If during the course of the call the physician/provider feels a video visit is not appropriate, you will not be charged for this service.\"    Patient has given verbal consent to a Video visit? Yes  How would you like to obtain your AVS? AVS Preference: [email or web address].  If dropped by the video visit, the video invitation should be sent to: Text to cell phone: [phone]  Will anyone else be joining your video visit? No    Video Start Time: 4:29 PM    Additional provider notes:     Assessment and Plan:     Class 3 severe obesity due to excess calories with serious comorbidity and body mass index (BMI) of 40.0 to 44.9 in adult (H)  30 y.o. year old female in clinic today to discuss treatment of the following conditions through diet and lifestyle modification and weight loss:  1. Class 3 severe obesity due to excess calories with serious comorbidity and body mass index (BMI) of 40.0 to 44.9 in adult (H)    2. Dysmetabolic syndrome    3. Vitamin D deficiency    4. Prediabetes    5. Snores      The patient's weight loss result since the last visit was mixed based on weight stability.  The patient has been off plan for the past month.  She has a goal to meal plan and shop for healthy " "foods.  I discouraged her from her goal of \"keto\" eating.     - continue phentermine/topiramate.  No side effects with dose adjustment.     - continue walking/swimming and exercising.    - make a meal plan.    Follow-up: 1 months        Continue supplements.    Recommend increasing movement throughout the day--sitting less, moving more.  Will increase activity over time to reach a goal of at least 150 minutes of moderate exercise each week.  Behavior modification:  Cognitive restructuring exercises, journaling stressors, triggers for food cravings.  Dietary Intervention:  Reduced calorie, reduced carbohydrates, whole food diet.  Greater than 50% of this 15 minute visit was spent in counseling regarding patient's obesity, medications, dietary, exercise, and behavior modification recommendations.    Subjective  Patient presents for treatment of chronic, comorbid conditions using intensive therapeutic lifestyle interventions including nutrition, physical activity, and behavior management.   - successes: \"Nothing.\"     - struggles: \"I didn't do well in July.\"  She states that she was inconsistent with taking medication and meals.   - exercise plan: more active.   - tracking/journaling: ad graeme.  She was aware that she was off plan.     - following nutritional plan: more carbs (camping and celebrations).     - hunger: increased   - medication side effects: none    No flowsheet data found.    Patient Active Problem List   Diagnosis     Dysmetabolic syndrome     Prediabetes     Vitamin D deficiency     Class 3 severe obesity due to excess calories with serious comorbidity and body mass index (BMI) of 40.0 to 44.9 in adult (H)     Snores       Current Outpatient Medications on File Prior to Visit   Medication Sig Dispense Refill     ergocalciferol (ERGOCALCIFEROL) 1,250 mcg (50,000 unit) capsule Take 1 capsule (50,000 Units total) by mouth once a week for 12 doses. 12 capsule 0     metFORMIN (GLUCOPHAGE XR) 500 MG 24 hr " tablet Take 1 tablet (500 mg total) by mouth daily with breakfast. 90 tablet 1     [DISCONTINUED] phentermine 15 MG capsule Take 1 capsule (15 mg total) by mouth every morning. 30 capsule 0     [DISCONTINUED] topiramate (TOPAMAX) 25 MG tablet Take 1 tablet (25 mg total) by mouth daily AND 2 tablets (50 mg total) daily with supper. 90 tablet 1     No current facility-administered medications on file prior to visit.      Objective:  There were no vitals filed for this visit.  Initial Weight: 249 lbs  Weight: (!) 239 lb (108.4 kg)  Weight loss from initial: 10  % Weight loss: 4.02 %    Body mass index is 41.02 kg/m .  Patient's last menstrual period was 07/10/2020.  GENERAL: Healthy, alert and no distress  EYES: Eyes grossly normal to inspection. No discharge or erythema, or obvious scleral/conjunctival abnormalities.  RESP: No audible wheeze, cough, or visible cyanosis.  No visible retractions or increased work of breathing.    SKIN: Visible skin clear. No significant rash, abnormal pigmentation or lesions.  NEURO: Cranial nerves grossly intact. Mentation and speech appropriate for age.  PSYCH: Mentation appears normal, affect normal/bright, judgement and insight intact, normal speech and appearance well-groomed    This note has been dictated using voice recognition software. Any grammatical or context distortions are unintentional and inherent to the software        Video-Visit Details    Type of service:  Video Visit    Video End Time (time video stopped): 4:39 PM  Originating Location (pt. Location): Home    Distant Location (provider location):  [facility] MEDICINE/OB     Platform used for Video Visit: Minal

## 2021-06-11 NOTE — PROGRESS NOTES
ASSESSMENT:  1. Dysmetabolic syndrome  Lipid Cascade   2. Vitamin D deficiency  Vitamin D, Total (25-Hydroxy)   3. Prediabetes     4. BMI 38.0-38.9,adult           PLAN:  Follow up in 1 month.  Continue medications.  Continue supplements.  Doing well with phentermine, refill given today.  We talked about her possibly meeting with [employer] in order to learn healthy  options that she can incorporate into her cooking.  I suggested that she go ahead and increase her metformin to 2 daily.  I also reiterated that phentermine is a pregnancy category X that she needs to be on birth control if she is on this medication.  She is now sexually active so I think it is a good idea to start that medication.  She states that she has symptoms suggesting she is going to get her period very soon and so she will started right after that.  She does use a condom each and every time.  The patient is due to have fasting lab work and a vitamin D level repeated and a lab order was placed for that.  She will likely need an In[employer] at her next visit.  Recommend increasing movement throughout the day--sitting less, moving more.  Will increase activity over time to reach a goal of at least 150 minutes of moderate exercise each week.  Behavior modification:  Cognitive restructuring exercises, journaling stressors, triggers for food cravings.  Dietary Intervention:  Reduced calorie, reduced carbohydrates, whole food diet.  Greater than 50% of this 15 minute visit was spent in counseling regarding patient's obesity, medications, dietary, exercise, and behavior modification recommendations.      SUBJECTIVE  Patient presents for treatment of chronic, comorbid conditions using intensive therapeutic lifestyle interventions including nutrition, physical activity, and behavior management.  The patient overall is doing well although struggled a little more this month with cravings.  When asked what she is craving for she states it is primarily for  her traditional Asian food but for food that she knows is not that healthy.  She mentions Asian noodles, a girls and sticky rice as things that she misses.  She has had some of those but in small portions.  She continues to find the phentermine helps with her appetite but just not as much with cravings.  She is currently taking metformin 1 daily and wonders if she should try to increase that to 2 as directed.  She continues to play football and is quite physically active.    Are you experiencing any side effects to the medications:  No  Hunger control:  good; craving a bit more  Exercise was discussed: yes  Taking supplements:  yes  Discussed journaling food:  no  Patient is pleased with the current results:  yes  The patient is following the nutrition plan:  yes  Barriers to losing weight: none    Patient Active Problem List   Diagnosis     Dysmetabolic syndrome     Prediabetes     Vitamin D deficiency     BMI 38.0-38.9,adult       Current Outpatient Prescriptions on File Prior to Visit   Medication Sig Dispense Refill     metFORMIN (GLUCOPHAGE XR) 500 MG 24 hr tablet Take 1 tablet (500 mg total) by mouth daily with breakfast. 60 tablet 3     [DISCONTINUED] phentermine (ADIPEX-P) 37.5 mg tablet Take 1/2 tablet 11am and 1/2 tablet 3pm 30 tablet 0     No current facility-administered medications on file prior to visit.        The following portions of the patient's history were reviewed and updated as appropriate: allergies, current medications, past family history, past medical history, past social history, past surgical history and problem list.    Review of Systems  Pertinent items are noted in HPI.        OBJECTIVE:  Vitals:    06/12/17 1615   BP: 118/72   Pulse: 68     Initial Weight: 222.2 lbs  Weight: 201 lb 6.4 oz (91.4 kg)  Weight loss from initial: 20.8  % Weight loss: 9.36 %  Body mass index is 34.57 kg/(m^2).  General:  Patient is alert, pleasant, no distress.  Patient is obese.       .  This note has  been dictated using voice recognition software. Any grammatical or context distortions are unintentional and inherent to the software

## 2021-06-11 NOTE — PROGRESS NOTES
"Jorge Holliday is a 30 y.o. female who is being evaluated via a billable video visit.      The patient has been notified of following:     \"This video visit will be conducted via a call between you and your physician/provider. We have found that certain health care needs can be provided without the need for an in-person physical exam.  This service lets us provide the care you need with a video conversation.  If a prescription is necessary we can send it directly to your pharmacy.  If lab work is needed we can place an order for that and you can then stop by our lab to have the test done at a later time.    Video visits are billed at different rates depending on your insurance coverage. Please reach out to your insurance provider with any questions.    If during the course of the call the physician/provider feels a video visit is not appropriate, you will not be charged for this service.\"    Patient has given verbal consent to a Video visit? Yes  How would you like to obtain your AVS? AVS Preference: Mail a copy.  If dropped by the video visit, the video invitation should be sent to: Send to e-mail at: [email or web address]  Will anyone else be joining your video visit? No    Video Start Time: 10:00 AM    Additional provider notes:     Assessment and Plan:     Class 3 severe obesity due to excess calories with serious comorbidity and body mass index (BMI) of 40.0 to 44.9 in adult (H)  30 y.o. year old female in clinic today to discuss treatment of the following conditions through diet and lifestyle modification and weight loss:  1. Class 3 severe obesity due to excess calories with serious comorbidity and body mass index (BMI) of 40.0 to 44.9 in adult (H)    2. Prediabetes    3. Dysmetabolic syndrome      The patient's weight loss result since the last visit was successful based on weight loss.  Weekend diet is different than her week day nutrition.   She is sleep deprived.  She is adding more nutrition.    - exercise has " "been scarce.     - recommended more sleep per night   - continue phentermine/topiramate.  Continue metformin.   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   - 6 week follow-up    Continue supplements.    Recommend increasing movement throughout the day--sitting less, moving more.  Will increase activity over time to reach a goal of at least 150 minutes of moderate exercise each week.  Behavior modification:  Cognitive restructuring exercises, journaling stressors, triggers for food cravings.  Dietary Intervention:  Reduced calorie, reduced carbohydrates, whole food diet.  Greater than 50% of this 15 minute visit was spent in counseling regarding patient's obesity, medications, dietary, exercise, and behavior modification recommendations.    Subjective  Patient presents for treatment of chronic, comorbid conditions using intensive therapeutic lifestyle interventions including nutrition, physical activity, and behavior management.   - successes: \"overall, I have been doing pretty well.\" Better managing hunger.  She has been taking her medication regularly.  Right now she likes the types of foods that she is eating.    - struggles: life events continue to through her off plan.  Routine is fragile.  This has been true in the past as well.     - exercise plan: \"I am not doing enough of that.\"  She is not exercising on a regular basis.  In the past she has played a lot of sports. Less in the context of COVID-19.  She enjoys hiking.     - tracking/journaling: ad graeme.  \"keto\" framework.  She does continue to eat rice and breads.  Mostly on the weekends.     - following nutritional plan: mostly.  Deviations from plan: on weekends.  \"I feel swollen.\"    - hunger: improved but she still feels hungry in the evenings.  She continues to stay up late (she is working and getting up around 7am.  She goes to bed at 1am-2am.)   - medication: benefits: none. side effects: appetite suppression    No flowsheet data " found.    Patient Active Problem List   Diagnosis     Dysmetabolic syndrome     Prediabetes     Vitamin D deficiency     Class 3 severe obesity due to excess calories with serious comorbidity and body mass index (BMI) of 40.0 to 44.9 in adult (H)     Snores       Current Outpatient Medications on File Prior to Visit   Medication Sig Dispense Refill     topiramate (TOPAMAX) 25 MG tablet Take 1 tablet (25 mg total) by mouth daily AND 2 tablets (50 mg total) daily with supper. 90 tablet 1     [DISCONTINUED] metFORMIN (GLUCOPHAGE XR) 500 MG 24 hr tablet Take 1 tablet (500 mg total) by mouth daily with breakfast. 90 tablet 1     [DISCONTINUED] phentermine 15 MG capsule Take 1 capsule (15 mg total) by mouth every morning. 30 capsule 0     No current facility-administered medications on file prior to visit.        Objective:  There were no vitals filed for this visit.  Initial Weight: 249 lbs  Weight: (!) 239 lb (108.4 kg)  Weight loss from initial: 10  % Weight loss: 4.02 %    There is no height or weight on file to calculate BMI.  No LMP recorded.  GENERAL: Healthy, alert and no distress  EYES: Eyes grossly normal to inspection. No discharge or erythema, or obvious scleral/conjunctival abnormalities.  RESP: No audible wheeze, cough, or visible cyanosis.  No visible retractions or increased work of breathing.    SKIN: Visible skin clear. No significant rash, abnormal pigmentation or lesions.  NEURO: Cranial nerves grossly intact. Mentation and speech appropriate for age.  PSYCH: Mentation appears normal, affect normal/bright, judgement and insight intact, normal speech and appearance well-groomed    This note has been dictated using voice recognition software. Any grammatical or context distortions are unintentional and inherent to the software.    Video-Visit Details    Type of service:  Video Visit    Video End Time (time video stopped): 10:15 AM  Originating Location (pt. Location): Home    Distant Location (provider  location):  [facility]/OB     Platform used for Video Visit: Minal Martinez MD

## 2021-06-11 NOTE — PROGRESS NOTES
ASSESSMENT:  1. Dysmetabolic syndrome     2. Amenorrhea  Pregnancy, Urine   3. Prediabetes     4. BMI 38.0-38.9,adult           PLAN:  Follow up in 1 month.  Continue medications.  Continue supplements.  This month concentrate on figuring out what exercise she will replace football with this fall when the season ends.   Doing well with phentermine, refill given today.  The patient was prescribed birth control pills last month as she has started to become sexually active.  However, the patient reports that she has not gotten a true period yet this month.  She did just take a urine pregnancy test and it was negative at home.  I explained to the patient should start the birth control then right away and she will start today.  I reminded her again that birth control pills are not effective for contraception until she has been on them consistently for 1 month.  Therefore, she should continue to use condoms as a form of backup contraception.  Recommend increasing movement throughout the day--sitting less, moving more.  Will increase activity over time to reach a goal of at least 150 minutes of moderate exercise each week.  Behavior modification:  Cognitive restructuring exercises, journaling stressors, triggers for food cravings.  Dietary Intervention:  Reduced calorie, reduced carbohydrates, whole food diet.  Greater than 50% of this 15 minute visit was spent in counseling regarding patient's obesity, medications, dietary, exercise, and behavior modification recommendations.      SUBJECTIVE  Patient presents for treatment of chronic, comorbid conditions using intensive therapeutic lifestyle interventions including nutrition, physical activity, and behavior management.  The patient has had another successful month with a little over 4 pound weight loss this month.  The patient continues to play football about 3 days per week equaling about 7 hours worth.  The patient continues to find the phentermine helpful at curbing  her appetite.  She definitely feels she is getting in enough protein every day and limiting her carbohydrates.  Her work schedule change in the past couple months of the one thing she expresses struggling with is more meal planning for the day because she used to do that in the morning and it feels less consistent when she is tired in the evening.    Are you experiencing any side effects to the medications:  No  Hunger control:  good  Exercise was discussed: yes; football 3X per week  Taking supplements:  yes  Discussed journaling food:  no  Patient is pleased with the current results:  yes  The patient is following the nutrition plan:  yes  Barriers to losing weight:  none    Patient Active Problem List   Diagnosis     Dysmetabolic syndrome     Prediabetes     Vitamin D deficiency     BMI 38.0-38.9,adult       Current Outpatient Prescriptions on File Prior to Visit   Medication Sig Dispense Refill     levonorgestrel-ethinyl estradiol (AVIANE,ALESSE,LESSINA) 0.1-20 mg-mcg per tablet Take 1 tablet by mouth daily. 3 Package 3     metFORMIN (GLUCOPHAGE XR) 500 MG 24 hr tablet Take 1 tablet (500 mg total) by mouth daily with breakfast. 60 tablet 3     [DISCONTINUED] phentermine (ADIPEX-P) 37.5 mg tablet Take 1/2 tablet 11am and 1/2 tablet 3pm 30 tablet 0     No current facility-administered medications on file prior to visit.        The following portions of the patient's history were reviewed and updated as appropriate: allergies, current medications, past family history, past medical history, past social history, past surgical history and problem list.    Review of Systems  Pertinent items are noted in HPI.        OBJECTIVE:  Vitals:    07/17/17 0811   BP: 100/62   Pulse: (!) 58     Initial Weight: 222.2 lbs  Weight: 197 lb (89.4 kg)  Weight loss from initial: 25.2  % Weight loss: 11.34 %  Body Fat %: 41.6  Waist Circumference: 42 inches  Neck Circumference: 14.5 inches  Body mass index is 33.81 kg/(m^2).  General:   Patient is alert, pleasant, no distress.  Patient is obese.       .  This note has been dictated using voice recognition software. Any grammatical or context distortions are unintentional and inherent to the software

## 2021-06-12 NOTE — PROGRESS NOTES
Assessment and Plan:     BMI 38.0-38.9,adult  27 y.o. year old female in clinic today to discuss treatment of the following conditions through diet and lifestyle modification and weight loss:  1. BMI 38.0-38.9,adult    2. Prediabetes    3. Dysmetabolic syndrome    4. Vitamin D deficiency      The patient's efforts this past month were successful as evidenced by weight loss.  The patient was unable to establish an exercise regimen that worked with her knee injury.  Her surgeries next week.  We discussed adherence to physical therapy recommendations as well as resistance training and core strengthening.  The patient will specifically work on resuming meal prepping/planning she found this particularly helpful early in the program.  She is not interested in tracking and using a quantitative approach.  She will abstain from phentermine until after surgery.  Return to clinic in 1 month.     Follow up in 1 month.  Continue supplements.    Recommend increasing movement throughout the day--sitting less, moving more.  Will increase activity over time to reach a goal of at least 150 minutes of moderate exercise each week.  Behavior modification:  Cognitive restructuring exercises, journaling stressors, triggers for food cravings.  Dietary Intervention:  Reduced calorie, reduced carbohydrates, whole food diet.  Greater than 50% of this 15 minute visit was spent in counseling regarding patient's obesity, medications, dietary, exercise, and behavior modification recommendations.      Subjective  Patient presents for treatment of chronic, comorbid conditions using intensive therapeutic lifestyle interventions including nutrition, physical activity, and behavior management.    She feels like she has been struggling in the past couple of months.  Exercise has been challenging.  She went to the gym recently for the first time.  Meal prepping/preparing has been difficult because she shares a kitchen with her boyfriend's mother who is  cooking constantly (family business).  She is interested in increasing her efforts with respect to meal planning/prepping.        Are you experiencing any side effects to the medications:  No  Hunger control:  good  Exercise was discussed: yes  Taking supplements:  yes  Discussed journaling food:  yes  Patient is pleased with the current results:  yes  The patient is following the nutrition plan:  yes  Barriers to losing weight:  Behavior:  inadequate exercise    Patient Active Problem List   Diagnosis     Dysmetabolic syndrome     Prediabetes     Vitamin D deficiency     BMI 38.0-38.9,adult       Current Outpatient Prescriptions on File Prior to Visit   Medication Sig Dispense Refill     levonorgestrel-ethinyl estradiol (AVIANE,ALESSE,LESSINA) 0.1-20 mg-mcg per tablet Take 1 tablet by mouth daily. 3 Package 3     [DISCONTINUED] metFORMIN (GLUCOPHAGE XR) 500 MG 24 hr tablet Take 1 tablet (500 mg total) by mouth daily with breakfast. 60 tablet 3     [DISCONTINUED] phentermine (ADIPEX-P) 37.5 mg tablet Take 1/2 tablet 11am and 1/2 tablet 3pm 30 tablet 0     No current facility-administered medications on file prior to visit.        Objective:  Vitals:    09/13/17 1057   BP: 126/82   Pulse: 68     Initial Weight: 222.2 lbs  Weight: 196 lb (88.9 kg)  Weight loss from initial: 26.2  % Weight loss: 11.79 %  Body mass index is 33.64 kg/(m^2).  General:  Patient is alert, pleasant, no distress.  Patient is obese.    his note has been dictated using voice recognition software. Any grammatical or context distortions are unintentional and inherent to the software

## 2021-06-12 NOTE — PROGRESS NOTES
"Jorge Holliday is a 30 y.o. female who is being evaluated via a billable video visit.      The patient has been notified of following:     \"This video visit will be conducted via a call between you and your physician/provider. We have found that certain health care needs can be provided without the need for an in-person physical exam.  This service lets us provide the care you need with a video conversation.  If a prescription is necessary we can send it directly to your pharmacy.  If lab work is needed we can place an order for that and you can then stop by our lab to have the test done at a later time.    Video visits are billed at different rates depending on your insurance coverage. Please reach out to your insurance provider with any questions.    If during the course of the call the physician/provider feels a video visit is not appropriate, you will not be charged for this service.\"    Patient has given verbal consent to a Video visit? Yes  How would you like to obtain your AVS? AVS Preference: [email or web address].  If dropped by the video visit, the video invitation should be sent to: Text to cell phone: [phone]  Will anyone else be joining your video visit? No    Video Start Time: 11:47 AM    Additional provider notes:     Assessment and Plan:     Class 3 severe obesity due to excess calories with serious comorbidity and body mass index (BMI) of 40.0 to 44.9 in adult (H)  30 y.o. year old female in clinic today to discuss treatment of the following conditions through diet and lifestyle modification and weight loss:  1. Class 3 severe obesity due to excess calories with serious comorbidity and body mass index (BMI) of 40.0 to 44.9 in adult (H)    2. Dysmetabolic syndrome    3. Prediabetes    4. Vitamin D deficiency    5. Snores      The patient's weight loss result since the last visit was successful based on increased exercise.  She anticipates struggles with ad graeme carb eating during the holiday season.  She is " "planning \"discipline\" during the holiday seasons.  \"Weight is a little slow.\"   - continue phentermine/topiramate.  We will increase dose of phentermine for late November through early January and then revert to previous dose of 15 mg.  Patient understands that this is to solidify the lifestyle gains she has earned throughout the summer.   - I encouraged her to think about her exercise plan for the winter months   - 2 month follow-up recommended based on perceived holiday season challenges.       Continue supplements.    Recommend increasing movement throughout the day--sitting less, moving more.  Will increase activity over time to reach a goal of at least 150 minutes of moderate exercise each week.  Behavior modification:  Cognitive restructuring exercises, journaling stressors, triggers for food cravings.  Dietary Intervention:  Reduced calorie, reduced carbohydrates, whole food diet.  Greater than 50% of this 20 minute visit was spent in counseling regarding patient's obesity, medications, dietary, exercise, and behavior modification recommendations.    Subjective  Patient presents for treatment of chronic, comorbid conditions using intensive therapeutic lifestyle interventions including nutrition, physical activity, and behavior management.   - losing weight in stomach and face.     - struggles: she is anticipating holiday overeating.  \"Not much self-control.\"    - exercise plan: more physically active during the past past 6 weeks. More walks.  Increased stamina.    - tracking/journaling: ad graeme.  More carbs over the past couple of weeks.  With ketogenic style she does not think that she has consumed enough fats and veggies.    - following nutritional plan: mostly.  Deviations from plan: \"potluck style.\"   - hunger: controlled.   - medication: benefits: appetite suppression. side effects: none    No flowsheet data found.    Patient Active Problem List   Diagnosis     Dysmetabolic syndrome     Prediabetes     " Vitamin D deficiency     Class 3 severe obesity due to excess calories with serious comorbidity and body mass index (BMI) of 40.0 to 44.9 in adult (H)     Snores       Current Outpatient Medications on File Prior to Visit   Medication Sig Dispense Refill     metFORMIN (GLUCOPHAGE XR) 500 MG 24 hr tablet Take 1 tablet (500 mg total) by mouth daily with breakfast. 90 tablet 1     [DISCONTINUED] phentermine 15 MG capsule Take 1 capsule (15 mg total) by mouth every morning. 90 capsule 0     [DISCONTINUED] topiramate (TOPAMAX) 25 MG tablet Take 1 tablet (25 mg total) by mouth daily AND 2 tablets (50 mg total) daily with supper. 90 tablet 1     No current facility-administered medications on file prior to visit.        Objective:  There were no vitals filed for this visit.  Initial Weight: 249 lbs  Weight: (!) 224 lb (101.6 kg)  Weight loss from initial: 25  % Weight loss: 10.04 %    Body mass index is 38.45 kg/m .  Patient's last menstrual period was 09/20/2020.  GENERAL: Healthy, alert and no distress  EYES: Eyes grossly normal to inspection. No discharge or erythema, or obvious scleral/conjunctival abnormalities.  RESP: No audible wheeze, cough, or visible cyanosis.  No visible retractions or increased work of breathing.    SKIN: Visible skin clear. No significant rash, abnormal pigmentation or lesions.  NEURO: Cranial nerves grossly intact. Mentation and speech appropriate for age.  PSYCH: Mentation appears normal, affect normal/bright, judgement and insight intact, normal speech and appearance well-groomed      This note has been dictated using voice recognition software. Any grammatical or context distortions are unintentional and inherent to the software.    Video-Visit Details    Type of service:  Video Visit    Video End Time (time video stopped): 12:07 PM  Originating Location (pt. Location): Home    Distant Location (provider location):  [facility]     Platform used for Video Visit:  Minal Martinez MD

## 2021-06-12 NOTE — PROGRESS NOTES
ASSESSMENT:  1. Dysmetabolic syndrome     2. Prediabetes     3. BMI 38.0-38.9,adult           PLAN:  Follow up in 1 month.  Continue medications.  Continue supplements.  This month concentrate on journaling and increasing exercise.  The patient reports getting her period shortly after our last visit and now is on her oral contraceptive pills.  The patient is not going to be able to exercise to the extent that she has been exercising over the summer due to her ACL injury.  We talked about the necessity of finding alternative forms of exercise in order to maintain her weight loss and discussed options.  I encouraged her to consider looking into the [employer] in order to do pool based exercises or to focus on upper extremity resistance and some cardiovascular exercise.  We also talked about the critical importance to focus on her nutrition during this time of restricted exercise.  We talked about the value of journaling for this reason.  We also talked about the importance of meal planning.  I did remind her that she needs to be off of her phentermine for 2 full weeks prior to her surgery in September.  Doing well with phentermine, refill given today.  Recommend increasing movement throughout the day--sitting less, moving more.  Will increase activity over time to reach a goal of at least 150 minutes of moderate exercise each week.  Behavior modification:  Cognitive restructuring exercises, journaling stressors, triggers for food cravings.  Dietary Intervention:  Reduced calorie, reduced carbohydrates, whole food diet.  Greater than 50% of this 15 minute visit was spent in counseling regarding patient's obesity, medications, dietary, exercise, and behavior modification recommendations.      SUBJECTIVE  Patient presents for treatment of chronic, comorbid conditions using intensive therapeutic lifestyle interventions including nutrition, physical activity, and behavior management. The patient reports that she has not been  able to participate in football the past month due to re-injuring her ACL. She has plans to repair this at the end of September. She has not been able to do any exercise due to the knee pain. She is looking forward to being back to her usual work schedule in about one week and getting on track with meal preparation.     Are you experiencing any side effects to the medications:  No  Hunger control:  not as good with taking the medication all at one time  Exercise was discussed: yes  Taking supplements:  yes  Discussed journaling food:  yes  Patient is pleased with the current results:  no  The patient is following the nutrition plan:  no  Barriers to losing weight:  Behavior:  inadequate exercise    Patient Active Problem List   Diagnosis     Dysmetabolic syndrome     Prediabetes     Vitamin D deficiency     BMI 38.0-38.9,adult       Current Outpatient Prescriptions on File Prior to Visit   Medication Sig Dispense Refill     levonorgestrel-ethinyl estradiol (AVIANE,ALESSE,LESSINA) 0.1-20 mg-mcg per tablet Take 1 tablet by mouth daily. 3 Package 3     metFORMIN (GLUCOPHAGE XR) 500 MG 24 hr tablet Take 1 tablet (500 mg total) by mouth daily with breakfast. 60 tablet 3     [DISCONTINUED] phentermine (ADIPEX-P) 37.5 mg tablet Take 1/2 tablet 11am and 1/2 tablet 3pm 30 tablet 0     No current facility-administered medications on file prior to visit.        The following portions of the patient's history were reviewed and updated as appropriate: allergies, current medications, past family history, past medical history, past social history, past surgical history and problem list.    Review of Systems  Pertinent items are noted in HPI.        OBJECTIVE:  Vitals:    08/15/17 1400   BP: 122/74   Pulse: 66     Initial Weight: 222.2 lbs  Weight: 199 lb 14.4 oz (90.7 kg)  Weight loss from initial: 22.3  % Weight loss: 10.04 %  Body Fat %: 41.6  Waist Circumference: 42 inches  Neck Circumference: 14.5 inches  Body mass index is  34.31 kg/(m^2).  General:  Patient is alert, pleasant, no distress.  Patient is obese.       .  This note has been dictated using voice recognition software. Any grammatical or context distortions are unintentional and inherent to the software

## 2021-06-13 NOTE — PROGRESS NOTES
ASSESSMENT:  1. Dysmetabolic syndrome  phentermine (ADIPEX-P) 37.5 mg tablet   2. BMI 38.0-38.9,adult  phentermine (ADIPEX-P) 37.5 mg tablet   3. Prediabetes  phentermine (ADIPEX-P) 37.5 mg tablet   4. Vitamin D deficiency  phentermine (ADIPEX-P) 37.5 mg tablet         PLAN:  The patient is well aware of some of her barriers to weight loss and at this current time certainly recovering from her knee surgery is both a priority but also a challenge.  The patient remains engaged in weight loss and would like to continue to lose but does not have a lot of opportunities to increase her physical activity at this time and has some real challenges as it relates to lack of access to a kitchen to prepare her own foods.  We did discuss freezing foods as an option today.  She and her boyfriend are looking at moving out at one point in the near future and are looking for places to live.  I think it is reasonable to continue her on phentermine and I provided her with a 60 day supply today with the plans to follow-up in 2 months.  Hopefully at that time will be able to talk about making some changes that will impact her weight loss.  I did discuss that she appears to be at a plateau and weaning off phentermine will need to occur if we cannot breakthrough this plateau.  The patient was comfortable with that plan.  Doing well with phentermine, refill given today.  Recommend increasing movement throughout the day--sitting less, moving more.  Will increase activity over time to reach a goal of at least 150 minutes of moderate exercise each week.  Behavior modification:  Cognitive restructuring exercises, journaling stressors, triggers for food cravings.  Dietary Intervention:  Reduced calorie, reduced carbohydrates, whole food diet.  Greater than 50% of this 15 minute visit was spent in counseling regarding patient's obesity, medications, dietary, exercise, and behavior modification recommendations.      SUBJECTIVE  Patient presents  "for treatment of chronic, comorbid conditions using intensive therapeutic lifestyle interventions including nutrition, physical activity, and behavior management.  The patient successfully underwent right knee surgery and is recovering with the help of physical therapy.  She remains nonweightbearing.  Despite this, she is still been going to the gym and doing some stationary bicycling as well as upper extremity resistance training.  The patient was off of her phentermine for a couple of weeks leading up to the surgery and now is back on.  She does feel that the medication is helping again.  1 of her significant barriers is a lack of access to her kitchen.  She and her boyfriend are living with his parents and she does not have much opportunity to use the kitchen.  She also works from 12 until 8 PM each day and does not have dinner until after she gets home from work.  The patient continues on the metformin as well and is tolerating that well.  The patient is not logging her foods but does keep track of her carbohydrates in her head.    Are you experiencing any side effects to the medications:  No  Hunger control:  good  Exercise was discussed: yes; has been stationary bike along with physical therapy as well as some weight lifting  Taking supplements:  yes  Discussed journaling food:  yes  Patient is pleased with the current results:  yes  The patient is following the nutrition plan:  yes  Barriers to losing weight:  Unable to exercise as much due to \"non-weight bearing\" following knee surgery    Patient Active Problem List   Diagnosis     Dysmetabolic syndrome     Prediabetes     Vitamin D deficiency     BMI 38.0-38.9,adult       Current Outpatient Prescriptions on File Prior to Visit   Medication Sig Dispense Refill     levonorgestrel-ethinyl estradiol (AVIANE,ALESSE,LESSINA) 0.1-20 mg-mcg per tablet Take 1 tablet by mouth daily. 3 Package 3     metFORMIN (GLUCOPHAGE XR) 500 MG 24 hr tablet Take 1 tablet (500 mg " total) by mouth daily with breakfast. 90 tablet 1     phentermine (ADIPEX-P) 37.5 mg tablet Take 1/2 tablet 11am and 1/2 tablet 3pm 30 tablet 0     No current facility-administered medications on file prior to visit.        The following portions of the patient's history were reviewed and updated as appropriate: allergies, current medications, past family history, past medical history, past social history, past surgical history and problem list.    Review of Systems  Pertinent items are noted in HPI.        OBJECTIVE:  Vitals:    10/16/17 1051   BP: 110/64   Pulse: 72     Initial Weight: 222.2 lbs  Weight: 197 lb (89.4 kg)  Weight loss from initial: 25.2  % Weight loss: 11.34 %  Body mass index is 33.81 kg/(m^2).  General:  Patient is alert, pleasant, no distress.  Patient is obese.       .  This note has been dictated using voice recognition software. Any grammatical or context distortions are unintentional and inherent to the software

## 2021-06-14 NOTE — PROGRESS NOTES
"ASSESSMENT:  1. Prediabetes  phentermine (ADIPEX-P) 37.5 mg tablet   2. BMI 38.0-38.9,adult  phentermine (ADIPEX-P) 37.5 mg tablet   3. Dysmetabolic syndrome  phentermine (ADIPEX-P) 37.5 mg tablet   4. Vitamin D deficiency  phentermine (ADIPEX-P) 37.5 mg tablet         PLAN:  Follow up in 1 month.  Continue medications.  Continue supplements.  This month concentrate on journaling and increasing exercise.  We discussed strategies today in particular around some of those cravings.  We discussed that excluding those from her diet altogether is not a realistic option and is likely the reason for these cravings.  We discussed providing a balanced meal that includes these foods as a component of her meal but in the proper quantity.  We also discussed the frequency with which she would have these \"treats\".  The patient is getting close to where she is able to exercise more and I encouraged her to start increasing that in order to promote loss of the weight that she regained during her recovery from her surgery.  We also discussed today the importance of logging to track her food more closely in order to get back on track.  Doing well with phentermine, refill given today.  Recommend increasing movement throughout the day--sitting less, moving more.  Will increase activity over time to reach a goal of at least 150 minutes of moderate exercise each week.  Behavior modification:  Cognitive restructuring exercises, journaling stressors, triggers for food cravings.  Dietary Intervention:  Reduced calorie, reduced carbohydrates, whole food diet.  Greater than 50% of this 15 minute visit was spent in counseling regarding patient's obesity, medications, dietary, exercise, and behavior modification recommendations.      SUBJECTIVE  Patient presents for treatment of chronic, comorbid conditions using intensive therapeutic lifestyle interventions including nutrition, physical activity, and behavior management.  The patient comes in " today for weight loss follow-up visit 2 months after her last appointment.  The patient has been recovering from ankle surgery during this time and has been recovering well.  She is at the point where she can ambulate normally although does have some restrictions.  She has been getting gradually back into some exercise.  Patient has gained about 4 pounds over this time period.  She has continued taking her metformin and phentermine and continues to tolerate those well.  The patient and her boyfriend moved into their own place and she finds that this has been helpful in being able to better control her environment from a food perspective.  However, she still struggles a bit with social calories as there are a lot of family gatherings.  The patient also reports recently she has been having a lot more cravings specifically for some of the foods that she culturally grew up with that include things like egg rolls and noodle dishes.    Are you experiencing any side effects to the medications:  No  Hunger control:  unchanged  Exercise was discussed: yes  Taking supplements:  yes  Discussed journaling food:  yes  Patient is pleased with the current results:  no  The patient is following the nutrition plan:  yes  Barriers to losing weight: Cravings    Patient Active Problem List   Diagnosis     Dysmetabolic syndrome     Prediabetes     Vitamin D deficiency     BMI 38.0-38.9,adult       Current Outpatient Prescriptions on File Prior to Visit   Medication Sig Dispense Refill     aspirin 81 mg chewable tablet TAKE 2 TABS BY MOUTH DAILY. START AT 8AM  0     levonorgestrel-ethinyl estradiol (AVIANE,ALESSE,LESSINA) 0.1-20 mg-mcg per tablet Take 1 tablet by mouth daily. 3 Package 3     metFORMIN (GLUCOPHAGE XR) 500 MG 24 hr tablet Take 1 tablet (500 mg total) by mouth daily with breakfast. 90 tablet 1     No current facility-administered medications on file prior to visit.        The following portions of the patient's history were  reviewed and updated as appropriate: allergies, current medications, past family history, past medical history, past social history, past surgical history and problem list.    Review of Systems  Pertinent items are noted in HPI.        OBJECTIVE:  Vitals:    12/13/17 1000   BP: 122/74   Pulse: 68     Initial Weight: 222.2 lbs  Weight: 200 lb 14.4 oz (91.1 kg)  Weight loss from initial: 21.3  % Weight loss: 9.59 %  Body mass index is 34.48 kg/(m^2).  General:  Patient is alert, pleasant, no distress.  Patient is obese.       .  This note has been dictated using voice recognition software. Any grammatical or context distortions are unintentional and inherent to the software

## 2021-06-15 PROBLEM — R73.03 PREDIABETES: Status: ACTIVE | Noted: 2017-03-23

## 2021-06-15 PROBLEM — E66.813 CLASS 3 SEVERE OBESITY DUE TO EXCESS CALORIES WITH SERIOUS COMORBIDITY AND BODY MASS INDEX (BMI) OF 40.0 TO 44.9 IN ADULT (H): Status: ACTIVE | Noted: 2017-04-20

## 2021-06-15 PROBLEM — E88.810 DYSMETABOLIC SYNDROME: Status: ACTIVE | Noted: 2017-03-23

## 2021-06-15 PROBLEM — E55.9 VITAMIN D DEFICIENCY: Status: ACTIVE | Noted: 2017-03-23

## 2021-06-15 PROBLEM — E66.01 CLASS 3 SEVERE OBESITY DUE TO EXCESS CALORIES WITH SERIOUS COMORBIDITY AND BODY MASS INDEX (BMI) OF 40.0 TO 44.9 IN ADULT (H): Status: ACTIVE | Noted: 2017-04-20

## 2021-06-15 NOTE — PROGRESS NOTES
ASSESSMENT:  1. Dysmetabolic syndrome  phentermine (ADIPEX-P) 37.5 mg tablet   2. BMI 38.0-38.9,adult  phentermine (ADIPEX-P) 37.5 mg tablet   3. Prediabetes  phentermine (ADIPEX-P) 37.5 mg tablet   4. Vitamin D deficiency  phentermine (ADIPEX-P) 37.5 mg tablet         PLAN:  Follow up in 1 month.  Continue medications.  Continue supplements.  This month concentrate on increasing exercise and meeting with Health and  from [facility] to help support and guide social calorie issues.  Doing well with phentermine, refill given today.  I also repeated the patient's in body today which did show a slight reduction in lean body mass from July and increase in body fat mass from that time likely in large part due to the fact that she is not exercising as much as she had been then.  Think we still have some opportunity for improving the intensity of her approach to both nutrition and exercise and see some additional results.  Recommend increasing movement throughout the day--sitting less, moving more.  Will increase activity over time to reach a goal of at least 150 minutes of moderate exercise each week.  Behavior modification:  Cognitive restructuring exercises, journaling stressors, triggers for food cravings.  Dietary Intervention:  Reduced calorie, reduced carbohydrates, whole food diet.  Greater than 50% of this 15 minute visit was spent in counseling regarding patient's obesity, medications, dietary, exercise, and behavior modification recommendations.      SUBJECTIVE  Patient presents for treatment of chronic, comorbid conditions using intensive therapeutic lifestyle interventions including nutrition, physical activity, and behavior management.  The patient was last seen a couple of months ago and overall is doing reasonably well.  The patient joined a gym recently and has been consistently going about 3 times per week.  The patient states that she overall is doing well with her nutrition and would  estimate that she is consistent with her nutrition about 75% of the days.  Social calories still tend to be her biggest barrier.  The last time we talked about incorporating  foods that she loves into her diet as she was having significant cravings and was missing these that she had completely excluded them to try to be healthier.  The patient states that she has been successful at doing this by making stir fries and doing other Asian type cuisine but with a healthy approach.  The patient remains motivated to stay on track and would ideally like to continue to lose weight.  She feels that the metformin and phentermine are still helping.    Are you experiencing any side effects to the medications:  No  Hunger control:  good  Exercise was discussed: yes  Taking supplements:  yes  Discussed journaling food:  no  Patient is pleased with the current results:  yes  The patient is following the nutrition plan:  yes  Barriers to losing weight:  Behavior:  social calories    Patient Active Problem List   Diagnosis     Dysmetabolic syndrome     Prediabetes     Vitamin D deficiency     BMI 38.0-38.9,adult       Current Outpatient Prescriptions on File Prior to Visit   Medication Sig Dispense Refill     levonorgestrel-ethinyl estradiol (AVIANE,ALESSE,LESSINA) 0.1-20 mg-mcg per tablet Take 1 tablet by mouth daily. 3 Package 3     metFORMIN (GLUCOPHAGE XR) 500 MG 24 hr tablet Take 1 tablet (500 mg total) by mouth daily with breakfast. 90 tablet 1     [DISCONTINUED] phentermine (ADIPEX-P) 37.5 mg tablet TAKE 1/2 TABLET 11AM AND 1/2 TABLET 3PM 30 tablet 0     aspirin 81 mg chewable tablet TAKE 2 TABS BY MOUTH DAILY. START AT 8AM  0     No current facility-administered medications on file prior to visit.        The following portions of the patient's history were reviewed and updated as appropriate: allergies, current medications, past family history, past medical history, past social history, past surgical history and problem  list.    Review of Systems  Pertinent items are noted in HPI.        OBJECTIVE:  Vitals:    02/08/18 1234   BP: 112/76   Pulse: 64   Resp: 12   Temp: 98.5  F (36.9  C)     Initial Weight: 222.2 lbs  Weight: 198 lb 12.8 oz (90.2 kg)  Weight loss from initial: 23.4  % Weight loss: 10.53 %  Body mass index is 34.12 kg/(m^2).  General:  Patient is alert, pleasant, no distress.  Patient is obese.       .  This note has been dictated using voice recognition software. Any grammatical or context distortions are unintentional and inherent to the software

## 2021-06-16 PROBLEM — R06.83 SNORES: Status: ACTIVE | Noted: 2020-06-04

## 2021-06-27 ENCOUNTER — HEALTH MAINTENANCE LETTER (OUTPATIENT)
Age: 31
End: 2021-06-27

## 2021-10-17 ENCOUNTER — HEALTH MAINTENANCE LETTER (OUTPATIENT)
Age: 31
End: 2021-10-17

## 2022-07-24 ENCOUNTER — HEALTH MAINTENANCE LETTER (OUTPATIENT)
Age: 32
End: 2022-07-24

## 2022-09-19 ENCOUNTER — VIRTUAL VISIT (OUTPATIENT)
Dept: FAMILY MEDICINE | Facility: CLINIC | Age: 32
End: 2022-09-19
Payer: COMMERCIAL

## 2022-09-19 DIAGNOSIS — Z71.84 TRAVEL ADVICE ENCOUNTER: ICD-10-CM

## 2022-09-19 DIAGNOSIS — E66.01 CLASS 3 SEVERE OBESITY DUE TO EXCESS CALORIES WITH SERIOUS COMORBIDITY AND BODY MASS INDEX (BMI) OF 40.0 TO 44.9 IN ADULT (H): Primary | ICD-10-CM

## 2022-09-19 DIAGNOSIS — E66.813 CLASS 3 SEVERE OBESITY DUE TO EXCESS CALORIES WITH SERIOUS COMORBIDITY AND BODY MASS INDEX (BMI) OF 40.0 TO 44.9 IN ADULT (H): Primary | ICD-10-CM

## 2022-09-19 PROCEDURE — 99213 OFFICE O/P EST LOW 20 MIN: CPT | Mod: 95 | Performed by: INTERNAL MEDICINE

## 2022-09-19 NOTE — PROGRESS NOTES
Answers for HPI/ROS submitted by the patient on 9/19/2022  What is the reason for your visit today? : Need a certificate of good health to apply for a student visa  How many servings of fruits and vegetables do you eat daily?: 0-1  On average, how many sweetened beverages do you drink each day (Examples: soda, juice, sweet tea, etc.  Do NOT count diet or artificially sweetened beverages)?: 1  How many minutes a day do you exercise enough to make your heart beat faster?: 20 to 29  How many days a week do you exercise enough to make your heart beat faster?: 4  How many days per week do you miss taking your medication?: 0      Lobito is a 32 year old who is being evaluated via a billable video visit.      How would you like to obtain your AVS? [employer]  If the video visit is dropped, the invitation should be resent by: Text to cell phone: [phone]  Will anyone else be joining your video visit? No          Assessment & Plan     Travel advice encounter  She is planning to travel to [facility] and then needs some paperwork  In particular the need documentation that she is free from certain health conditions  I  have reviewed her chart and I am happy to complete the paperwork  She will fax it to me or send it to me via [employer]    Class 3 severe obesity due to excess calories with serious comorbidity and body mass index (BMI) of 40.0 to 44.9 in adult (H)  She was on Topamax/Adipex/metformin before but she is not taking these anymore        20 minutes spent on the date of the encounter doing chart review, history and exam, documentation and further activities per the note           Return in about 3 months (around 12/19/2022), or if symptoms worsen or fail to improve.    Ahsan Beltran MD  [facility] SENG Patel is a 32 year old, presenting for the following health issues:  No chief complaint on file.      HPI   Needs some paperwork completed for travel to [place]        Review of Systems    Constitutional, HEENT, cardiovascular, pulmonary, gi and gu systems are negative, except as otherwise noted.      Objective           Vitals:  No vitals were obtained today due to virtual visit.    Physical Exam   GENERAL: Healthy, alert and no distress  EYES: Eyes grossly normal to inspection.  No discharge or erythema, or obvious scleral/conjunctival abnormalities.  RESP: No audible wheeze, cough, or visible cyanosis.  No visible retractions or increased work of breathing.    SKIN: Visible skin clear. No significant rash, abnormal pigmentation or lesions.  NEURO: Cranial nerves grossly intact.  Mentation and speech appropriate for age.  PSYCH: Mentation appears normal, affect normal/bright, judgement and insight intact, normal speech and appearance well-groomed.                Video-Visit Details    Video Start Time: 4:30 PM    Type of service:  Video Visit    Video End Time:4:45 PM    Originating Location (pt. Location): Home    Distant Location (provider location):  [facility]     Platform used for Video Visit: [facility]  ers for HPI/ROS submitted by the patient on 9/19/2022  What is the reason for your visit today? : Need a certificate of good health to apply for a student visa  How many servings of fruits and vegetables do you eat daily?: 0-1  On average, how many sweetened beverages do you drink each day (Examples: soda, juice, sweet tea, etc.  Do NOT count diet or artificially sweetened beverages)?: 1  How many minutes a day do you exercise enough to make your heart beat faster?: 20 to 29  How many days a week do you exercise enough to make your heart beat faster?: 4  How many days per week do you miss taking your medication?: 0

## 2022-09-26 ENCOUNTER — MYC MEDICAL ADVICE (OUTPATIENT)
Dept: FAMILY MEDICINE | Facility: CLINIC | Age: 32
End: 2022-09-26

## 2022-10-02 ENCOUNTER — HEALTH MAINTENANCE LETTER (OUTPATIENT)
Age: 32
End: 2022-10-02

## 2022-10-06 ENCOUNTER — MYC MEDICAL ADVICE (OUTPATIENT)
Dept: FAMILY MEDICINE | Facility: CLINIC | Age: 32
End: 2022-10-06

## 2022-10-07 NOTE — TELEPHONE ENCOUNTER
Form completed by Dr. Beltran. Copy placed in abstract and TC bin. Original placed at BK  for patient . Called pt and lvm so that he is aware this has been completed.

## 2022-11-04 ENCOUNTER — OFFICE VISIT (OUTPATIENT)
Dept: FAMILY MEDICINE | Facility: CLINIC | Age: 32
End: 2022-11-04
Payer: COMMERCIAL

## 2022-11-04 VITALS
OXYGEN SATURATION: 98 % | RESPIRATION RATE: 16 BRPM | TEMPERATURE: 99.1 F | BODY MASS INDEX: 39.26 KG/M2 | HEART RATE: 87 BPM | SYSTOLIC BLOOD PRESSURE: 158 MMHG | DIASTOLIC BLOOD PRESSURE: 91 MMHG | WEIGHT: 228.7 LBS

## 2022-11-04 DIAGNOSIS — R07.0 THROAT PAIN: Primary | ICD-10-CM

## 2022-11-04 DIAGNOSIS — J02.0 STREP THROAT: ICD-10-CM

## 2022-11-04 LAB — DEPRECATED S PYO AG THROAT QL EIA: POSITIVE

## 2022-11-04 PROCEDURE — 87880 STREP A ASSAY W/OPTIC: CPT

## 2022-11-04 PROCEDURE — 99213 OFFICE O/P EST LOW 20 MIN: CPT | Performed by: PHYSICIAN ASSISTANT

## 2022-11-04 RX ORDER — NITROFURANTOIN 25; 75 MG/1; MG/1
100 CAPSULE ORAL EVERY 12 HOURS
COMMUNITY
Start: 2022-05-14 | End: 2022-11-04

## 2022-11-04 RX ORDER — DIPHENOXYLATE HYDROCHLORIDE AND ATROPINE SULFATE 2.5; .025 MG/1; MG/1
1 TABLET ORAL
COMMUNITY
End: 2022-11-04

## 2022-11-04 RX ORDER — PHENTERMINE HYDROCHLORIDE 37.5 MG/1
37.5 TABLET ORAL
COMMUNITY
End: 2022-11-04

## 2022-11-04 RX ORDER — AMOXICILLIN 875 MG
875 TABLET ORAL 2 TIMES DAILY
Qty: 20 TABLET | Refills: 0 | Status: SHIPPED | OUTPATIENT
Start: 2022-11-04 | End: 2022-11-14

## 2022-11-04 RX ORDER — LEVONORGESTREL/ETHIN.ESTRADIOL 0.1-0.02MG
TABLET ORAL
COMMUNITY
End: 2022-11-04

## 2022-11-04 RX ORDER — NITROFURANTOIN 25; 75 MG/1; MG/1
CAPSULE ORAL
COMMUNITY
Start: 2022-08-12 | End: 2022-11-04

## 2023-08-12 ENCOUNTER — HEALTH MAINTENANCE LETTER (OUTPATIENT)
Age: 33
End: 2023-08-12

## 2024-08-05 ENCOUNTER — PATIENT OUTREACH (OUTPATIENT)
Dept: CARE COORDINATION | Facility: CLINIC | Age: 34
End: 2024-08-05
Payer: COMMERCIAL

## 2024-08-05 NOTE — PROGRESS NOTES
Clinic Care Coordination Contact  Program:   [facility]: [place]    Renewal:UCARE   Date Applied:      JEANIE Outreach:   8/5/24: 1st outreach attempt. Left a message on voicemail with call back information and requested return call.  Plan: CTA will call again within 2 weeks.  Fiordaliza Welch  Care   [facility]  Clinic Care Coordination  [phone]      Health Insurance:        Referral/Screening:

## 2024-08-19 ENCOUNTER — PATIENT OUTREACH (OUTPATIENT)
Dept: CARE COORDINATION | Facility: CLINIC | Age: 34
End: 2024-08-19
Payer: COMMERCIAL

## 2024-10-05 ENCOUNTER — HEALTH MAINTENANCE LETTER (OUTPATIENT)
Age: 34
End: 2024-10-05

## 2025-08-05 ENCOUNTER — OFFICE VISIT (OUTPATIENT)
Dept: URGENT CARE | Facility: URGENT CARE | Age: 35
End: 2025-08-05
Payer: COMMERCIAL

## 2025-08-05 VITALS
TEMPERATURE: 98.1 F | RESPIRATION RATE: 20 BRPM | HEIGHT: 64 IN | WEIGHT: 247 LBS | HEART RATE: 79 BPM | DIASTOLIC BLOOD PRESSURE: 128 MMHG | BODY MASS INDEX: 42.17 KG/M2 | OXYGEN SATURATION: 99 % | SYSTOLIC BLOOD PRESSURE: 172 MMHG

## 2025-08-05 DIAGNOSIS — I10 PRIMARY HYPERTENSION: Primary | ICD-10-CM

## 2025-08-05 LAB
ANION GAP SERPL CALCULATED.3IONS-SCNC: 10 MMOL/L (ref 3–14)
ATRIAL RATE - MUSE: 69 BPM
BUN SERPL-MCNC: 12 MG/DL (ref 7–30)
CALCIUM SERPL-MCNC: 9.3 MG/DL (ref 8.5–10.1)
CHLORIDE BLD-SCNC: 107 MMOL/L (ref 94–109)
CO2 SERPL-SCNC: 26 MMOL/L (ref 20–32)
CREAT SERPL-MCNC: 0.6 MG/DL (ref 0.52–1.04)
DIASTOLIC BLOOD PRESSURE - MUSE: NORMAL MMHG
EGFRCR SERPLBLD CKD-EPI 2021: >90 ML/MIN/1.73M2
EST. AVERAGE GLUCOSE BLD GHB EST-MCNC: 111 MG/DL
GLUCOSE BLD-MCNC: 88 MG/DL (ref 70–99)
HBA1C MFR BLD: 5.5 % (ref 0–5.6)
INTERPRETATION ECG - MUSE: NORMAL
P AXIS - MUSE: 34 DEGREES
POTASSIUM BLD-SCNC: 4 MMOL/L (ref 3.4–5.3)
PR INTERVAL - MUSE: 146 MS
QRS DURATION - MUSE: 88 MS
QT - MUSE: 424 MS
QTC - MUSE: 454 MS
R AXIS - MUSE: -8 DEGREES
SODIUM SERPL-SCNC: 143 MMOL/L (ref 135–145)
SYSTOLIC BLOOD PRESSURE - MUSE: NORMAL MMHG
T AXIS - MUSE: 0 DEGREES
TSH SERPL DL<=0.005 MIU/L-ACNC: 2.04 UIU/ML (ref 0.3–4.2)
VENTRICULAR RATE- MUSE: 69 BPM

## 2025-08-05 PROCEDURE — 84443 ASSAY THYROID STIM HORMONE: CPT | Performed by: PHYSICIAN ASSISTANT

## 2025-08-05 PROCEDURE — 83036 HEMOGLOBIN GLYCOSYLATED A1C: CPT

## 2025-08-05 PROCEDURE — 80048 BASIC METABOLIC PNL TOTAL CA: CPT

## 2025-08-05 RX ORDER — LISINOPRIL AND HYDROCHLOROTHIAZIDE 10; 12.5 MG/1; MG/1
1 TABLET ORAL DAILY
Qty: 30 TABLET | Refills: 0 | Status: SHIPPED | OUTPATIENT
Start: 2025-08-05 | End: 2025-09-04

## 2025-08-05 ASSESSMENT — ENCOUNTER SYMPTOMS
RESPIRATORY NEGATIVE: 1
NEUROLOGICAL NEGATIVE: 1
EYES NEGATIVE: 1
CARDIOVASCULAR NEGATIVE: 1
GASTROINTESTINAL NEGATIVE: 1

## 2025-08-19 ENCOUNTER — VIRTUAL VISIT (OUTPATIENT)
Dept: FAMILY MEDICINE | Facility: CLINIC | Age: 35
End: 2025-08-19
Payer: COMMERCIAL

## 2025-08-19 DIAGNOSIS — R06.83 SNORES: ICD-10-CM

## 2025-08-19 DIAGNOSIS — I10 PRIMARY HYPERTENSION: Primary | ICD-10-CM

## 2025-08-19 DIAGNOSIS — E66.813 CLASS 3 SEVERE OBESITY DUE TO EXCESS CALORIES WITH SERIOUS COMORBIDITY AND BODY MASS INDEX (BMI) OF 40.0 TO 44.9 IN ADULT (H): ICD-10-CM

## 2025-08-19 PROCEDURE — 98005 SYNCH AUDIO-VIDEO EST LOW 20: CPT | Performed by: PHYSICIAN ASSISTANT

## 2025-08-20 ENCOUNTER — PATIENT OUTREACH (OUTPATIENT)
Dept: CARE COORDINATION | Facility: CLINIC | Age: 35
End: 2025-08-20
Payer: COMMERCIAL

## 2025-08-27 ENCOUNTER — OFFICE VISIT (OUTPATIENT)
Dept: FAMILY MEDICINE | Facility: CLINIC | Age: 35
End: 2025-08-27
Payer: COMMERCIAL

## 2025-08-27 VITALS
HEART RATE: 100 BPM | DIASTOLIC BLOOD PRESSURE: 86 MMHG | RESPIRATION RATE: 20 BRPM | SYSTOLIC BLOOD PRESSURE: 131 MMHG | BODY MASS INDEX: 40.98 KG/M2 | HEIGHT: 65 IN | TEMPERATURE: 97.8 F | WEIGHT: 246 LBS | OXYGEN SATURATION: 98 %

## 2025-08-27 DIAGNOSIS — I10 PRIMARY HYPERTENSION: ICD-10-CM

## 2025-08-27 DIAGNOSIS — Z11.59 NEED FOR HEPATITIS C SCREENING TEST: ICD-10-CM

## 2025-08-27 DIAGNOSIS — Z12.4 CERVICAL CANCER SCREENING: Primary | ICD-10-CM

## 2025-08-27 DIAGNOSIS — Z00.00 PHYSICAL EXAM, ANNUAL: ICD-10-CM

## 2025-08-27 DIAGNOSIS — Z11.4 SCREENING FOR HIV (HUMAN IMMUNODEFICIENCY VIRUS): ICD-10-CM

## 2025-08-27 PROCEDURE — 90651 9VHPV VACCINE 2/3 DOSE IM: CPT | Performed by: PHYSICIAN ASSISTANT

## 2025-08-27 PROCEDURE — 36415 COLL VENOUS BLD VENIPUNCTURE: CPT | Performed by: PHYSICIAN ASSISTANT

## 2025-08-27 PROCEDURE — 80061 LIPID PANEL: CPT | Performed by: PHYSICIAN ASSISTANT

## 2025-08-27 PROCEDURE — 3075F SYST BP GE 130 - 139MM HG: CPT | Performed by: PHYSICIAN ASSISTANT

## 2025-08-27 PROCEDURE — G2211 COMPLEX E/M VISIT ADD ON: HCPCS | Performed by: PHYSICIAN ASSISTANT

## 2025-08-27 PROCEDURE — 90471 IMMUNIZATION ADMIN: CPT | Performed by: PHYSICIAN ASSISTANT

## 2025-08-27 PROCEDURE — 80048 BASIC METABOLIC PNL TOTAL CA: CPT | Performed by: PHYSICIAN ASSISTANT

## 2025-08-27 PROCEDURE — 99214 OFFICE O/P EST MOD 30 MIN: CPT | Mod: 25 | Performed by: PHYSICIAN ASSISTANT

## 2025-08-27 PROCEDURE — 3079F DIAST BP 80-89 MM HG: CPT | Performed by: PHYSICIAN ASSISTANT

## 2025-08-27 PROCEDURE — 99395 PREV VISIT EST AGE 18-39: CPT | Mod: 25 | Performed by: PHYSICIAN ASSISTANT

## 2025-08-27 RX ORDER — LISINOPRIL AND HYDROCHLOROTHIAZIDE 10; 12.5 MG/1; MG/1
1 TABLET ORAL DAILY
Qty: 90 TABLET | Refills: 3 | Status: SHIPPED | OUTPATIENT
Start: 2025-08-27

## 2025-08-28 LAB
ANION GAP SERPL CALCULATED.3IONS-SCNC: 16 MMOL/L (ref 7–15)
BUN SERPL-MCNC: 15.5 MG/DL (ref 6–20)
CALCIUM SERPL-MCNC: 9.6 MG/DL (ref 8.8–10.4)
CHLORIDE SERPL-SCNC: 101 MMOL/L (ref 98–107)
CHOLEST SERPL-MCNC: 206 MG/DL
CREAT SERPL-MCNC: 0.7 MG/DL (ref 0.51–0.95)
EGFRCR SERPLBLD CKD-EPI 2021: >90 ML/MIN/1.73M2
FASTING STATUS PATIENT QL REPORTED: YES
FASTING STATUS PATIENT QL REPORTED: YES
GLUCOSE SERPL-MCNC: 81 MG/DL (ref 70–99)
HCO3 SERPL-SCNC: 23 MMOL/L (ref 22–29)
HDLC SERPL-MCNC: 50 MG/DL
LDLC SERPL CALC-MCNC: 106 MG/DL
NONHDLC SERPL-MCNC: 156 MG/DL
POTASSIUM SERPL-SCNC: 4.1 MMOL/L (ref 3.4–5.3)
SODIUM SERPL-SCNC: 140 MMOL/L (ref 135–145)
TRIGL SERPL-MCNC: 249 MG/DL

## 2025-09-02 LAB
BKR AP ASSOCIATED HPV REPORT: NORMAL
BKR LAB AP GYN ADEQUACY: NORMAL
BKR LAB AP GYN INTERPRETATION: NORMAL
BKR LAB AP LMP: NORMAL
BKR LAB AP PREVIOUS ABNORMAL: NORMAL
PATH REPORT.COMMENTS IMP SPEC: NORMAL
PATH REPORT.COMMENTS IMP SPEC: NORMAL
PATH REPORT.RELEVANT HX SPEC: NORMAL

## 2025-09-03 ENCOUNTER — PATIENT OUTREACH (OUTPATIENT)
Dept: FAMILY MEDICINE | Facility: CLINIC | Age: 35
End: 2025-09-03
Payer: COMMERCIAL

## 2025-09-03 PROBLEM — R87.810 CERVICAL HIGH RISK HPV (HUMAN PAPILLOMAVIRUS) TEST POSITIVE: Status: ACTIVE | Noted: 2025-09-03
